# Patient Record
Sex: MALE | Race: OTHER | HISPANIC OR LATINO | Employment: FULL TIME | URBAN - METROPOLITAN AREA
[De-identification: names, ages, dates, MRNs, and addresses within clinical notes are randomized per-mention and may not be internally consistent; named-entity substitution may affect disease eponyms.]

---

## 2018-10-29 ENCOUNTER — APPOINTMENT (OUTPATIENT)
Dept: RADIOLOGY | Age: 28
End: 2018-10-29
Attending: PHYSICIAN ASSISTANT
Payer: COMMERCIAL

## 2018-10-29 ENCOUNTER — OFFICE VISIT (OUTPATIENT)
Dept: URGENT CARE | Age: 28
End: 2018-10-29
Payer: COMMERCIAL

## 2018-10-29 VITALS
WEIGHT: 172 LBS | TEMPERATURE: 99.1 F | HEIGHT: 69 IN | RESPIRATION RATE: 20 BRPM | OXYGEN SATURATION: 98 % | BODY MASS INDEX: 25.48 KG/M2 | HEART RATE: 55 BPM | SYSTOLIC BLOOD PRESSURE: 132 MMHG | DIASTOLIC BLOOD PRESSURE: 70 MMHG

## 2018-10-29 DIAGNOSIS — S92.214A NONDISPLACED FRACTURE OF CUBOID BONE OF RIGHT FOOT, INITIAL ENCOUNTER FOR CLOSED FRACTURE: Primary | ICD-10-CM

## 2018-10-29 DIAGNOSIS — T14.90XA INJURY: ICD-10-CM

## 2018-10-29 PROCEDURE — 29515 APPLICATION SHORT LEG SPLINT: CPT | Performed by: FAMILY MEDICINE

## 2018-10-29 PROCEDURE — S9083 URGENT CARE CENTER GLOBAL: HCPCS | Performed by: FAMILY MEDICINE

## 2018-10-29 PROCEDURE — G0382 LEV 3 HOSP TYPE B ED VISIT: HCPCS | Performed by: FAMILY MEDICINE

## 2018-10-29 PROCEDURE — 73630 X-RAY EXAM OF FOOT: CPT

## 2018-10-29 RX ORDER — ALBUTEROL SULFATE 90 UG/1
AEROSOL, METERED RESPIRATORY (INHALATION)
COMMUNITY
Start: 2017-05-22 | End: 2021-08-04

## 2018-10-29 NOTE — LETTER
October 29, 2018     Patient: Napoleon Paget   YOB: 1990   Date of Visit: 10/29/2018       To Whom it May Concern:    Napoleon Paget was seen in my clinic on 10/29/2018  Please excuse from school due to injury            Sincerely,          Juan Chase PA-C        CC: No Recipients

## 2018-10-29 NOTE — LETTER
October 29, 2018     Patient: Hanane Leigh   YOB: 1990   Date of Visit: 10/29/2018       To Whom It May Concern:    Please excuse Hanane Leigh from work 10/29/2018 due to injury         Sincerely,        Juan Chase PA-C    CC: No Recipients

## 2018-10-30 VITALS
DIASTOLIC BLOOD PRESSURE: 77 MMHG | HEART RATE: 80 BPM | SYSTOLIC BLOOD PRESSURE: 129 MMHG | BODY MASS INDEX: 25.4 KG/M2 | HEIGHT: 69 IN

## 2018-10-30 DIAGNOSIS — S92.214A NONDISPLACED FRACTURE OF CUBOID BONE OF RIGHT FOOT, INITIAL ENCOUNTER FOR CLOSED FRACTURE: Primary | ICD-10-CM

## 2018-10-30 PROCEDURE — 28455 TX TARSAL B1 FX W/MNPJ EACH: CPT | Performed by: ORTHOPAEDIC SURGERY

## 2018-10-30 PROCEDURE — 99203 OFFICE O/P NEW LOW 30 MIN: CPT | Performed by: ORTHOPAEDIC SURGERY

## 2018-10-30 NOTE — PROGRESS NOTES
[facility]        NAME: Cassius Weinstein is a 33 y o  male  : 1990    MRN: [id number]  DATE: 2018  TIME: 9:07 PM    Assessment and Plan   Nondisplaced fracture of cuboid bone of right foot, initial encounter for closed fracture [S92 214A]  1  Nondisplaced fracture of cuboid bone of right foot, initial encounter for closed fracture  XR foot 3+ vw right         Patient Instructions       Follow up with PCP in 3-5 days  Proceed to  ER if symptoms worsen  Chief Complaint     Chief Complaint   Patient presents with    Foot Injury     patient states he missed a step going down and hurt right foot  it happened last night  History of Present Illness       Patient is here for evaluation of pain in his right foot after he came down hard when he missed a step  He has been having difficulty walking  He denies any prior injury to this foot or ankle  Review of Systems   Review of Systems      Current Medications       Current Outpatient Prescriptions:     albuterol (VENTOLIN HFA) 90 mcg/act inhaler, , Disp: , Rfl:     Current Allergies     Allergies as of 10/29/2018 - Reviewed 10/29/2018   Allergen Reaction Noted    Aspirin Hives 2013    Ibuprofen Hives 05/10/2016    Penicillins Hives 05/10/2016            The following portions of the patient's history were reviewed and updated as appropriate: allergies, current medications, past family history, past medical history, past social history, past surgical history and problem list      Past Medical History:   Diagnosis Date    Allergic rhinitis     Asthma        History reviewed  No pertinent surgical history  History reviewed  No pertinent family history  Medications have been verified          Objective   /70   Pulse 55   Temp 99 1 °F (37 3 °C) (Temporal)   Resp 20   Ht 5' 9" (1 753 m)   Wt 78 kg (172 lb)   SpO2 98%   BMI 25 40 kg/m²        Physical Exam     Physical Exam   Constitutional: He is oriented to person, place, and time  He appears well-developed and well-nourished  No distress  HENT:   Head: Normocephalic and atraumatic  Musculoskeletal:   Range of motion of the right foot ankle intact but limited due to pain  Maximal tenderness over the right lateral foot over the cuboid bone  There is some mild focal soft tissue swelling  No visible ecchymosis  No open wound  No laxity  Neurological: He is oriented to person, place, and time  Skin: Skin is warm and dry  Psychiatric: He has a normal mood and affect  His behavior is normal    Nursing note and vitals reviewed  Splint application  Date/Time: 10/29/2018 9:08 PM  Performed by: Danish Ramos  Authorized by: Danish Ramos     Consent:     Consent obtained:  Verbal    Consent given by:  Patient    Risks discussed:  Discoloration, numbness, pain and swelling  Pre-procedure details:     Sensation:  Normal  Procedure details:     Laterality:  Right    Location:  Foot    Foot:  R foot    Strapping: yes      Splint type:  Short leg    Supplies:  Cotton padding and Ortho-Glass  Post-procedure details:     Pain:  Improved    Sensation:  Normal    Patient tolerance of procedure:   Tolerated well, no immediate complications

## 2018-10-30 NOTE — PATIENT INSTRUCTIONS
Ice and elevate as directed    Do not bear weight until you are re-evaluated by Orthopedics  Do not rest your axilla (underarm) on the top of the crutches  Support your weight on the hand       Take Tylenol as needed    Follow-up with orthopedics in 3-5 days

## 2018-10-30 NOTE — PROGRESS NOTES
Arlys Cogan, M D  Attending, Orthopaedic Surgery  [facility] [address] [facility] [address] [facility]        ORTHOPAEDIC FOOT AND ANKLE CLINIC VISIT     Assessment:     Encounter Diagnosis   Name Primary?  Nondisplaced fracture of cuboid bone of right foot, initial encounter for closed fracture Yes              Plan:   · The patient verbalized understanding of exam findings and treatment plan  We engaged in the shared decision-making process and treatment options were discussed at length with the patient  Surgical and conservative management discussed today along with risks and benefits  · NWB in [facility]  · Typically I place people with his injury on ASA daily for DVT ppx  He is unable to take this due to an allergy  We encouraged him to get up and walk every hour to prevent a blood clot  · He will have his cast removed in 3 weeks and be placed into a boot  Return in about 3 weeks (around 11/20/2018)  History of Present Illness:   Chief Complaint:   Chief Complaint   Patient presents with   [address] is a 33 y o  male who is being seen for right cuboid fracture  He slipped while walking down the stairs and sustained the injury  He was seen in the ED and splinted  He is unable to comfortably bear weight  Pain is localized at plantar cuboid with minimal radiating and described as sharp and severe  Patient denies numbness, tingling or radicular pain  Denies history of neuropathy  Patient does not smoke, does not have diabetes and does not take blood thinners  Patient denies family history of anesthesia complications and has not had any complications with anesthesia       Pain/symptom timing:  Worse during the day when active  Pain/symptom context:  Worse with activites and work  Pain/symptom modifying factors:  Rest makes better, activities make worse  Pain/symptom associated signs/symptoms: none    Prior treatment   · NSAIDsYes    · Injections No · Bracing/Orthotics Yes   · Physical Therapy No     Orthopedic Surgical History:   See above    Past Medical, Surgical and Social History:  Past Medical History:  has a past medical history of Allergic rhinitis and Asthma  Problem List:  does not have any pertinent problems on file  Past Surgical History:  has no past surgical history on file  Family History: family history is not on file  Social History:  reports that he has never smoked  He has never used smokeless tobacco  He reports that he does not drink alcohol or use drugs  Current Medications: has a current medication list which includes the following prescription(s): albuterol  Allergies: is allergic to aspirin; ibuprofen; and penicillins  Review of Systems:  General- denies fever/chills  HEENT- denies hearing loss or sore throat  Eyes- denies eye pain or visual disturbances, denies red eyes  Respiratory- denies cough or SOB  Cardio- denies chest pain or palpitations  GI- denies abdominal pain  Endocrine- denies urinary frequency  Urinary- denies pain with urination  Musculoskeletal- Negative except noted above  Skin- denies rashes or wounds  Neurological- denies dizziness or headache  Psychiatric- denies anxiety or difficulty concentrating    Physical Exam:   /77 (BP Location: Right arm, Patient Position: Sitting, Cuff Size: Adult)   Pulse 80   Ht 5' 9" (1 753 m)   BMI 25 40 kg/m²   General/Constitutional: No apparent distress: well-nourished and well developed  Eyes: normal ocular motion  Lymphatic: No appreciable lymphadenopathy  Respiratory: Non-labored breathing  Vascular: No edema, swelling or tenderness, except as noted in detailed exam   Integumentary: No impressive skin lesions present, except as noted in detailed exam   Neuro: No ataxia or tremors noted  Psych: Normal mood and affect, oriented to person, place and time  Appropriate affect  Musculoskeletal: Normal, except as noted in detailed exam and in HPI      Examination Right    Gait Unable to bear weight   Musculoskeletal Tender to palpation at cuboid plantarly    Skin Normal       Nails Normal    Range of Motion  Limited motion 2/2 pain    Stability Stable    Muscle Strength 5/5 tibialis anterior  5/5 gastrocnemius-soleus  5/5 posterior tibialis  5/5 peroneal/eversion strength  5/5 EHL  5/5 FHL    Neurologic Normal    Sensation Intact to light touch throughout sural, saphenous, superficial peroneal, deep peroneal and medial/lateral plantar nerve distributions  [place]-Marie 5 07 filament (10g) testing deferred  Cardiovascular Brisk capillary refill < 2 seconds,intact DP and PT pulses    Special Tests None      Imaging Studies:   3 views of the right foot were taken, reviewed and interpreted independently that demonstrate avulsion off plantar cuboid near the area of an os peroneum  Fracture line extends into the body of the cuboid but does not appear intra-articular  The fragment looks acute and not rounded like a typical os  Reviewed by me personally  Fracture / Dislocation Treatment  Date/Time: 10/30/2018 3:42 PM  Performed by: Eli Junior  Authorized by: Cande CRAWLEY     Patient Location:  Clinic  Consent given by:  Patient  Patient states understanding of procedure being performed: Yes    Test results available and properly labeled: Yes    Injury location:  Foot  Location details:  Right foot  Injury type:  Fracture  Fracture type: cuboid    Distal perfusion: normal    Neurological function: normal    Range of motion: reduced    Manipulation performed?: Yes    Reduction successful?: Yes    Immobilization:  Cast  Cast type:  Short leg  Supplies used:  Cotton padding and fiberglass  Neurovascular status: Neurovascularly intact    Distal perfusion: normal    Neurological function: normal    Range of motion: unchanged    Patient tolerance:  Patient tolerated the procedure well with no immediate complications          Grace Sale Lachman, MD  Foot & Ankle Surgery   Department of [address] [place] [address]      I personally performed the service  Mona Smolder Lachman, MD

## 2018-11-05 NOTE — TELEPHONE ENCOUNTER
Caller: patient  Call back number: [phone]  Patient's doctor: Lachman    Patient is asking for a note that he can not be on his foot  His employer does have light duty  Patient will have fax number before the note is done

## 2018-11-20 ENCOUNTER — OFFICE VISIT (OUTPATIENT)
Dept: OBGYN CLINIC | Facility: CLINIC | Age: 28
End: 2018-11-20

## 2018-11-20 VITALS — BODY MASS INDEX: 25.4 KG/M2 | HEIGHT: 69 IN

## 2018-11-20 DIAGNOSIS — S92.214D CLOSED NONDISPLACED FRACTURE OF CUBOID OF RIGHT FOOT WITH ROUTINE HEALING, SUBSEQUENT ENCOUNTER: Primary | ICD-10-CM

## 2018-11-20 DIAGNOSIS — S92.214A NONDISPLACED FRACTURE OF CUBOID BONE OF RIGHT FOOT, INITIAL ENCOUNTER FOR CLOSED FRACTURE: ICD-10-CM

## 2018-11-20 PROCEDURE — 99024 POSTOP FOLLOW-UP VISIT: CPT | Performed by: ORTHOPAEDIC SURGERY

## 2018-11-20 RX ORDER — ACETAMINOPHEN 325 MG/1
650 TABLET ORAL EVERY 6 HOURS PRN
COMMUNITY

## 2018-11-20 NOTE — PROGRESS NOTES
ALEXANDRE Leal  Attending, Orthopaedic Surgery  [facility] [address] [facility] [address] [facility]      ORTHOPAEDIC FOOT AND ANKLE CLINIC VISIT     Assessment:     Encounter Diagnosis   Name Primary?  Closed nondisplaced fracture of cuboid of right foot with routine healing, subsequent encounter Yes          Plan:   · The patient verbalized understanding of exam findings and treatment plan  We engaged in the shared decision-making process and treatment options were discussed at length with the patient  Surgical and conservative management discussed today along with risks and benefits  · Discontinue cast to a low tide cam walker boot  · He is to start physical therapy  · Activity to tolerance  Return in about 4 weeks (around 12/18/2018) for Recheck right foot cuboid fracture  Gaurav Simple History of Present Illness:   Chief Complaint:   Chief Complaint   Patient presents with    Right Ankle - Follow-up     Angela Scruggs is a 33 y o  male who is being seen in follow-up for Right cuboid fracture s/p fall  When we last saw he we recommended NWB short leg cast with crutch assistance  Pain has  improved  Residual pain is localized at the right cuboid and 5th metatarsal base with minimal radiating and described as sharp and severe  Pain/symptom timing:  Worse during the day when active  Pain/symptom context:  Worse with activites and work  Pain/symptom modifying factors:  Rest makes better, activities make worse  Pain/symptom associated signs/symptoms: none    Prior treatment   · NSAIDsYes   · Injections No   · Bracing/Orthotics Yes    · Physical Therapy No     Orthopedic Surgical History:   none    Past Medical, Surgical and Social History:  Past Medical History:  has a past medical history of Allergic rhinitis and Asthma  Problem List:  does not have any pertinent problems on file  Past Surgical History:  has no past surgical history on file  Family History: family history is not on file    Social History:  reports that he has never smoked  He has never used smokeless tobacco  He reports that he does not drink alcohol or use drugs  Current Medications: has a current medication list which includes the following prescription(s): acetaminophen and albuterol  Allergies: is allergic to aspirin; ibuprofen; and penicillins  Review of Systems:  General- denies fever/chills  HEENT- denies hearing loss or sore throat  Eyes- denies eye pain or visual disturbances, denies red eyes  Respiratory- denies cough or SOB  Cardio- denies chest pain or palpitations  GI- denies abdominal pain  Endocrine- denies urinary frequency  Urinary- denies pain with urination  Musculoskeletal- Negative except noted above  Skin- denies rashes or wounds  Neurological- denies dizziness or headache  Psychiatric- denies anxiety or difficulty concentrating    Physical Exam:   Ht 5' 9" (1 753 m)   BMI 25 40 kg/m²   General/Constitutional: No apparent distress: well-nourished and well developed  Eyes: normal ocular motion  Lymphatic: No appreciable lymphadenopathy  Respiratory: Non-labored breathing  Vascular: No edema, swelling or tenderness, except as noted in detailed exam   Integumentary: No impressive skin lesions present, except as noted in detailed exam   Neuro: No ataxia or tremors noted  Psych: Normal mood and affect, oriented to person, place and time  Appropriate affect  Musculoskeletal: Normal, except as noted in detailed exam and in HPI      Examination    Right    Gait NWB in a short leg cast    Musculoskeletal Tender to palpation at right cuboid and base of the 5th metatarsal     Skin No edema or ecchymosis present     Nails Normal    Range of Motion  20 degrees dorsiflexion, 40 degrees plantarflexion  Subtalar motion: normal    Stability Stable    Muscle Strength 5/5 tibialis anterior  5/5 gastrocnemius-soleus  4/5 posterior tibialis  4/5 peroneal/eversion strength  5/5 EHL  5/5 FHL    Neurologic Normal    Sensation Intact to light touch throughout sural, saphenous, superficial peroneal, deep peroneal and medial/lateral plantar nerve distributions  [place]-Marie 5 07 filament (10g) testing deferred  Cardiovascular Brisk capillary refill < 2 seconds,intact DP and PT pulses    Special Tests None      Imaging Studies:   No new imaging    Scribe Attestation    I,:   Raeann Aparicio am acting as a scribe while in the presence of the attending physician :        I,:   He Jean Baptiste MD personally performed the services described in this documentation    as scribed in my presence :                Therese Schneider Lachman, MD  Foot & Ankle Surgery   Department [address]      I personally performed the service  Therese Schneider Lachman, MD

## 2018-11-20 NOTE — LETTER
November 20, 2018     Patient: Angela Scruggs   YOB: 1990   Date of Visit: 11/20/2018       To Whom it May Concern:    Angela Scruggs is under my professional care  He was seen in my office on 11/20/2018  Francescakenneth Minayadominick is to remain on light duty until his next appointment x 4 weeks  He has a foot injury that requires periods of rest in his cam walker boot  If you have any questions or concerns, please don't hesitate to call           Sincerely,          Ramirez Mejias MD        CC: Angela Scruggs

## 2018-12-04 ENCOUNTER — EVALUATION (OUTPATIENT)
Dept: PHYSICAL THERAPY | Facility: CLINIC | Age: 28
End: 2018-12-04
Payer: COMMERCIAL

## 2018-12-04 DIAGNOSIS — S92.214A NONDISPLACED FRACTURE OF CUBOID BONE OF RIGHT FOOT, INITIAL ENCOUNTER FOR CLOSED FRACTURE: ICD-10-CM

## 2018-12-04 DIAGNOSIS — S92.214D CLOSED NONDISPLACED FRACTURE OF CUBOID OF RIGHT FOOT WITH ROUTINE HEALING, SUBSEQUENT ENCOUNTER: Primary | ICD-10-CM

## 2018-12-04 PROCEDURE — G8990 OTHER PT/OT CURRENT STATUS: HCPCS | Performed by: PHYSICAL THERAPIST

## 2018-12-04 PROCEDURE — 97162 PT EVAL MOD COMPLEX 30 MIN: CPT | Performed by: PHYSICAL THERAPIST

## 2018-12-04 PROCEDURE — G8991 OTHER PT/OT GOAL STATUS: HCPCS | Performed by: PHYSICAL THERAPIST

## 2018-12-04 PROCEDURE — 97110 THERAPEUTIC EXERCISES: CPT | Performed by: PHYSICAL THERAPIST

## 2018-12-04 NOTE — PROGRESS NOTES
PT Evaluation     Today's date: 2018  Patient name: Venkata Amaya  : 1990  MRN: [id number]  Referring provider: Clara Linda MD  Dx:   Encounter Diagnosis     ICD-10-CM    1  Closed nondisplaced fracture of cuboid of right foot with routine healing, subsequent encounter W38 605D Ambulatory referral to Physical Therapy                  Assessment  Assessment details: Patient presents with signs and symptoms consistent with referring diagnosis  Positive prognostic indicators include: Motivated to improve Negative prognostic indicators include: 20% co-insurance and OOW- will need to limit frequency of PT sessions  He presents with: pain, decreased: ROM, strength and  functional capacity consistent with s/p R cuboid fracture  [place]  He requires skilled PT to address these deficits and restore maximal functional capacity  Thank you for this pleasant referral        Impairments: abnormal gait, abnormal or restricted ROM, impaired balance, impaired physical strength, lacks appropriate home exercise program, pain with function and weight-bearing intolerance  Understanding of Dx/Px/POC: good   Prognosis: good    Goals  ST-6 weeks  1  Patient to be independent with HEP  2   Decrease pain at least 2 subjective levels  LT-12 weeks  1    Patient to voice comfort with self management of condition  2   75% or > decreased pain  3   75% or > decreased functional deficits  4   Normalize AROM of all deficit planes  5   Normalize strength  6   Functional Status Score: 64  7  Patient to voice understanding of activities/positions to avoid    8   Normalize Gait    Plan  Patient would benefit from: skilled PT  Referral necessary: No  Planned modality interventions: cryotherapy  Planned therapy interventions: IADL retraining, joint mobilization, manual therapy, motor coordination training, neuromuscular re-education, patient education, postural training, self care, strengthening, stretching, therapeutic activities, therapeutic exercise, home exercise program, flexibility, ADL training, balance and body mechanics training  Frequency: 2x week  Duration in weeks: 8  Treatment plan discussed with: patient        Subjective Evaluation    History of Present Illness  Date of onset: 10/28/2018  Mechanism of injury: Chief Complaint: Difficulty walking, pain    History: On 10/28 patient missed a step and suffered a R cuboid fracture  He had difficulty walking  He was seen in the ER, x-rays were performed and he was referred to ortho  He was casted for 3 weeks and was NWB  The cast was removed and he was placed in a CAM boot on   He has been PWB with [employer] in [employer]  He works in maintenance at [employer]  He was been OOW since the injury; but is on his feet all day when working  He lives in a multistory home  He is unable to drive  PMH:      Aggravating factors: Walking > 10'; Standing > 5 minutes  Relieving factors: NWB    Functional Deficits: OOW, Driving    Patient Goals: Walk normal    Quality of life: good    Pain  At best pain ratin  At worst pain ratin  Location: Lateral midfoot          Objective     Active Range of Motion     Right Ankle/Foot   Dorsiflexion (ke): 10 degrees   Dorsiflexion (kf): 10 degrees   Plantar flexion: 50 degrees   Inversion: 20 degrees   Eversion: 5 degrees   Great toe extension: 60 degrees     Passive Range of Motion     Right Ankle/Foot    Dorsiflexion (ke): 15 degrees   Dorsiflexion (kf): 15 degrees    Plantar flexion: 60 degrees   Inversion: 30 degrees   Eversion: 15 degrees     Strength/Myotome Testing     Right Ankle/Foot   Dorsiflexion: 4  Inversion: 3+  Eversion: 3+  Great toe extension: 4    General Comments     Ankle/Foot Comments   Weight Acceptance: 125 lbs (in CAM boot)  TTP R Cuboid (mild)  Girth:  Ankle R 24; L 23 cm            Cuboid mid foot: R 25; L 24cm  Hip/Knee MMT 4-/5      Flowsheet Rows      Most Recent Value   PT/OT G-Codes   Current Score  32   Projected Score  64          Precautions: WBAT (CAM boot), 2 Unit Max (20% co-insurance)    Daily Treatment Diary     Manual  12/4            PROM                                                                     Exercise Diary  12/4            HEP 10'            [employer] HEP updated (1x/week)                          TM PWM             Gastroc/Soleus Str             Ankle TB 4way             Leg press             Squats             Ankle Alphabet             Ankle Isometrics             SLS when able                          When no CAM:             HR/TR             SLS UE/LE             HR on Leg Press                                                                     Modalities              CP prn

## 2018-12-11 ENCOUNTER — APPOINTMENT (OUTPATIENT)
Dept: PHYSICAL THERAPY | Facility: CLINIC | Age: 28
End: 2018-12-11
Payer: COMMERCIAL

## 2018-12-14 ENCOUNTER — OFFICE VISIT (OUTPATIENT)
Dept: PHYSICAL THERAPY | Facility: CLINIC | Age: 28
End: 2018-12-14
Payer: COMMERCIAL

## 2018-12-14 DIAGNOSIS — S92.214A NONDISPLACED FRACTURE OF CUBOID BONE OF RIGHT FOOT, INITIAL ENCOUNTER FOR CLOSED FRACTURE: ICD-10-CM

## 2018-12-14 DIAGNOSIS — S92.214D CLOSED NONDISPLACED FRACTURE OF CUBOID OF RIGHT FOOT WITH ROUTINE HEALING, SUBSEQUENT ENCOUNTER: Primary | ICD-10-CM

## 2018-12-14 PROCEDURE — 97110 THERAPEUTIC EXERCISES: CPT

## 2018-12-14 PROCEDURE — 97140 MANUAL THERAPY 1/> REGIONS: CPT

## 2018-12-14 NOTE — PROGRESS NOTES
Daily Note     Today's date: 2018  Patient name: Victoriano Simmons  : 1990  MRN: [id number]  Referring provider: Erica Pedroza MD  Dx:   Encounter Diagnosis     ICD-10-CM    1  Closed nondisplaced fracture of cuboid of right foot with routine healing, subsequent encounter S92 214D    2  Nondisplaced fracture of cuboid bone of right foot, initial encounter for closed fracture S92 214A        Start Time: 1400  Stop Time: 1450  Total time in clinic (min): 50 minutes    Subjective: Pt reports no pain before session, Pt has MD appt on Dec 18th  Objective: See treatment diary below  Precautions: WBAT (CAM boot), 2 Unit Max (20% co-insurance)    Daily Treatment Diary     Manual             PROM 10' 10                                                                   Exercise Diary             HEP 10'            Kepe HEP updated (1x/week)                          TM PWM             Gastroc/Soleus Str  30"x3           Ankle TB 4way  20x  GTB           Leg press  40#  30x           Squats  1x Pain            Ankle Alphabet  2x           Ankle Isometrics  5"x10           SLS when able                          When no CAM:             HR/TR             SLS UE/LE             HR on Leg Press                                                                     Modalities              CP prn  10                                           Assessment: Tolerated treatment well  Patient demonstrated fatigue post treatment, exhibited good technique with therapeutic exercises and would benefit from continued PT      Plan: Continue per plan of care

## 2018-12-18 ENCOUNTER — OFFICE VISIT (OUTPATIENT)
Dept: PHYSICAL THERAPY | Facility: CLINIC | Age: 28
End: 2018-12-18
Payer: COMMERCIAL

## 2018-12-18 VITALS
HEIGHT: 69 IN | HEART RATE: 58 BPM | DIASTOLIC BLOOD PRESSURE: 78 MMHG | SYSTOLIC BLOOD PRESSURE: 121 MMHG | BODY MASS INDEX: 25.4 KG/M2

## 2018-12-18 DIAGNOSIS — S92.214A NONDISPLACED FRACTURE OF CUBOID BONE OF RIGHT FOOT, INITIAL ENCOUNTER FOR CLOSED FRACTURE: ICD-10-CM

## 2018-12-18 DIAGNOSIS — S92.214A NONDISPLACED FRACTURE OF CUBOID BONE OF RIGHT FOOT, INITIAL ENCOUNTER FOR CLOSED FRACTURE: Primary | ICD-10-CM

## 2018-12-18 DIAGNOSIS — S92.214D CLOSED NONDISPLACED FRACTURE OF CUBOID OF RIGHT FOOT WITH ROUTINE HEALING, SUBSEQUENT ENCOUNTER: Primary | ICD-10-CM

## 2018-12-18 PROCEDURE — 99024 POSTOP FOLLOW-UP VISIT: CPT | Performed by: ORTHOPAEDIC SURGERY

## 2018-12-18 PROCEDURE — 97140 MANUAL THERAPY 1/> REGIONS: CPT | Performed by: PHYSICAL THERAPIST

## 2018-12-18 PROCEDURE — 97112 NEUROMUSCULAR REEDUCATION: CPT | Performed by: PHYSICAL THERAPIST

## 2018-12-18 NOTE — PROGRESS NOTES
ALEXANDRE Robbins  Attending, Orthopaedic Surgery  Foot and [address] [facility] [address] [facility]      ORTHOPAEDIC FOOT AND ANKLE CLINIC VISIT     Assessment:     Encounter Diagnosis   Name Primary?  Nondisplaced fracture of cuboid bone of right foot, initial encounter for closed fracture Yes            Plan:   · The patient verbalized understanding of exam findings and treatment plan  We engaged in the shared decision-making process and treatment options were discussed at length with the patient  Surgical and conservative management discussed today along with risks and benefits  · He is to transition out of cam walker boot to stiff soled shoe  · May return to work without restrictions  · Activity to tolerance  Return if symptoms worsen or fail to improve  History of Present Illness:   Chief Complaint:   Chief Complaint   Patient presents with   [address] is a 33 y o  male who is being seen in follow-up for Right cuboid fracture  When we last saw he we recommended physical therapy, cam walker boot weight bearing  Pain has significantly  improved  Residual pain is localized at 5th metatarsal with minimal radiating and described as sharp and severe  Pain/symptom timing:  Worse during the day when active  Pain/symptom context:  Worse with activites and work  Pain/symptom modifying factors:  Rest makes better, activities make worse  Pain/symptom associated signs/symptoms: none    Prior treatment   · NSAIDsYes   · Injections No   · Bracing/Orthotics Yes    · Physical Therapy Yes     Orthopedic Surgical History:   none    Past Medical, Surgical and Social History:  Past Medical History:  has a past medical history of Allergic rhinitis and Asthma  Problem List:  does not have any pertinent problems on file  Past Surgical History:  has no past surgical history on file  Family History: family history is not on file    Social History:  reports that he has never smoked  He has never used smokeless tobacco  He reports that he does not drink alcohol or use drugs  Current Medications: has a current medication list which includes the following prescription(s): acetaminophen and albuterol  Allergies: is allergic to aspirin; ibuprofen; and penicillins  Review of Systems:  General- denies fever/chills  HEENT- denies hearing loss or sore throat  Eyes- denies eye pain or visual disturbances, denies red eyes  Respiratory- denies cough or SOB  Cardio- denies chest pain or palpitations  GI- denies abdominal pain  Endocrine- denies urinary frequency  Urinary- denies pain with urination  Musculoskeletal- Negative except noted above  Skin- denies rashes or wounds  Neurological- denies dizziness or headache  Psychiatric- denies anxiety or difficulty concentrating    Physical Exam:   /78 (BP Location: Right arm, Patient Position: Sitting, Cuff Size: Adult)   Pulse 58   Ht 5' 9" (1 753 m)   BMI 25 40 kg/m²   General/Constitutional: No apparent distress: well-nourished and well developed  Eyes: normal ocular motion  Lymphatic: No appreciable lymphadenopathy  Respiratory: Non-labored breathing  Vascular: No edema, swelling or tenderness, except as noted in detailed exam   Integumentary: No impressive skin lesions present, except as noted in detailed exam   Neuro: No ataxia or tremors noted  Psych: Normal mood and affect, oriented to person, place and time  Appropriate affect  Musculoskeletal: Normal, except as noted in detailed exam and in HPI  Examination    Right    Gait He is in a cam walker boot     Musculoskeletal Tender to palpation at 5th metatarsal shaft minimal, no pal     Skin Normal       Nails Normal    Range of Motion  full degrees dorsiflexion, full degrees plantarflexion  Subtalar motion: normal    Stability Stable    Muscle Strength 5/5 tibialis anterior  5/5 gastrocnemius-soleus  5/5 posterior tibialis  5/5 peroneal/eversion strength  5/5 EHL  5/5 FHL    Neurologic Normal    Sensation Intact to light touch throughout sural, saphenous, superficial peroneal, deep peroneal and medial/lateral plantar nerve distributions  [place] 5 07 filament (10g) testing deferred  Cardiovascular Brisk capillary refill < 2 seconds,intact DP and PT pulses    Special Tests None      Imaging Studies:   No new imaging          James R Lachman, MD  Foot & Ankle Surgery   Department of [address] [place] [address]      I personally performed the service  Fern Como Lachman, MD

## 2018-12-18 NOTE — LETTER
December 18, 2018     Patient: Sean Samuel   YOB: 1990   Date of Visit: 12/18/2018       To Whom it May Concern:    Sean Samuel is under my professional care  He was seen in my office on 12/18/2018  He may return to work on 12/20/18 with no restrictions       If you have any questions or concerns, please don't hesitate to call           Sincerely,          Efrain Caraballo MD        CC: Saen Jimmie

## 2018-12-18 NOTE — PROGRESS NOTES
Daily Note     Today's date: 2018  Patient name: Brina Ramirez  : 1990  MRN: [id number]  Referring provider: Cee Emerson MD  Dx:   Encounter Diagnosis     ICD-10-CM    1  Closed nondisplaced fracture of cuboid of right foot with routine healing, subsequent encounter S92 214D    2  Nondisplaced fracture of cuboid bone of right foot, initial encounter for closed fracture S92 214A                   Subjective: Saw  today- allowed to progress to [facility] out of CAM boot 3 hrs/day, Return to full work on       Objective: See treatment diary below      Assessment: Tolerated treatment well  Patient would benefit from continued PT      Plan: Continue per plan of care       Precautions: WBAT (CAM boot), 2 Unit Max (20% co-insurance)    Daily Treatment Diary     Manual            PROM 10' 10 10'                                                                  Exercise Diary            HEP 10'            Kepe HEP updated (1x/week)                          TM PWM             Gastroc/Soleus Str  30"x3 :20/6          Ankle TB 4way  20x  GTB GTB 20x          Leg press  40#  30x 90# SL 20          Squats  1x Pain  20x          Ankle Alphabet  2x           Ankle Isometrics  5"x10           SLS when able   EO/EC :10/10 ea                                    HR/TR   20x ea          SLS UE/LE cones   5x ea          HR on Leg Press   90# SL 30          Wt Shift   FWB          Wobble bd AP/ ML   SL 20          SLS TBand   NV          Cc walkouts   NV              Modalities              CP prn  10                                     Direct Supervision: [phone]

## 2018-12-26 ENCOUNTER — APPOINTMENT (OUTPATIENT)
Dept: PHYSICAL THERAPY | Facility: CLINIC | Age: 28
End: 2018-12-26
Payer: COMMERCIAL

## 2019-05-08 ENCOUNTER — HOSPITAL ENCOUNTER (EMERGENCY)
Facility: HOSPITAL | Age: 29
Discharge: HOME | End: 2019-05-08
Attending: EMERGENCY MEDICINE | Admitting: EMERGENCY MEDICINE
Payer: COMMERCIAL

## 2019-05-08 VITALS
RESPIRATION RATE: 17 BRPM | OXYGEN SATURATION: 96 % | HEIGHT: 69 IN | TEMPERATURE: 98.1 F | SYSTOLIC BLOOD PRESSURE: 116 MMHG | DIASTOLIC BLOOD PRESSURE: 80 MMHG | WEIGHT: 167 LBS | HEART RATE: 97 BPM | BODY MASS INDEX: 24.73 KG/M2

## 2019-05-08 DIAGNOSIS — R06.02 SOB (SHORTNESS OF BREATH): Primary | ICD-10-CM

## 2019-05-08 DIAGNOSIS — H65.492 CHRONIC MEE (MIDDLE EAR EFFUSION), LEFT: ICD-10-CM

## 2019-05-08 DIAGNOSIS — J45.21 MILD INTERMITTENT ASTHMA WITH EXACERBATION: ICD-10-CM

## 2019-05-08 DIAGNOSIS — J30.2 SEASONAL ALLERGIES: ICD-10-CM

## 2019-05-08 PROCEDURE — 99284 EMERGENCY DEPT VISIT MOD MDM: CPT | Performed by: EMERGENCY MEDICINE

## 2019-05-08 PROCEDURE — 94640 AIRWAY INHALATION TREATMENT: CPT

## 2019-05-08 PROCEDURE — 99284 EMERGENCY DEPT VISIT MOD MDM: CPT

## 2019-05-08 RX ORDER — SODIUM CHLORIDE FOR INHALATION 0.9 %
VIAL, NEBULIZER (ML) INHALATION
Status: COMPLETED
Start: 2019-05-08 | End: 2019-05-08

## 2019-05-08 RX ORDER — ALBUTEROL SULFATE 90 UG/1
2 AEROSOL, METERED RESPIRATORY (INHALATION) ONCE
Status: COMPLETED | OUTPATIENT
Start: 2019-05-08 | End: 2019-05-08

## 2019-05-08 RX ORDER — IPRATROPIUM BROMIDE AND ALBUTEROL SULFATE 2.5; .5 MG/3ML; MG/3ML
3 SOLUTION RESPIRATORY (INHALATION)
Status: DISCONTINUED | OUTPATIENT
Start: 2019-05-08 | End: 2019-05-08 | Stop reason: HOSPADM

## 2019-05-08 RX ADMIN — ISODIUM CHLORIDE 3 ML: 0.03 SOLUTION RESPIRATORY (INHALATION) at 15:20

## 2019-05-08 RX ADMIN — ALBUTEROL SULFATE 2 PUFF: 90 AEROSOL, METERED RESPIRATORY (INHALATION) at 15:58

## 2019-05-08 RX ADMIN — IPRATROPIUM BROMIDE AND ALBUTEROL SULFATE 3 ML: .5; 3 SOLUTION RESPIRATORY (INHALATION) at 15:20

## 2019-05-08 RX ADMIN — DEXAMETHASONE SODIUM PHOSPHATE 10 MG: 10 INJECTION, SOLUTION INTRAMUSCULAR; INTRAVENOUS at 15:19

## 2019-05-24 ENCOUNTER — HOSPITAL ENCOUNTER (EMERGENCY)
Facility: HOSPITAL | Age: 29
Discharge: HOME | End: 2019-05-24
Attending: EMERGENCY MEDICINE | Admitting: EMERGENCY MEDICINE
Payer: COMMERCIAL

## 2019-05-24 VITALS
SYSTOLIC BLOOD PRESSURE: 132 MMHG | RESPIRATION RATE: 24 BRPM | TEMPERATURE: 97.9 F | HEART RATE: 86 BPM | DIASTOLIC BLOOD PRESSURE: 76 MMHG | OXYGEN SATURATION: 93 %

## 2019-05-24 DIAGNOSIS — R06.2 WHEEZING: ICD-10-CM

## 2019-05-24 DIAGNOSIS — R06.00 DYSPNEA: ICD-10-CM

## 2019-05-24 DIAGNOSIS — J45.901 ASTHMA EXACERBATION: Primary | ICD-10-CM

## 2019-05-24 PROCEDURE — 94640 AIRWAY INHALATION TREATMENT: CPT

## 2019-05-24 PROCEDURE — 99284 EMERGENCY DEPT VISIT MOD MDM: CPT | Performed by: EMERGENCY MEDICINE

## 2019-05-24 PROCEDURE — 99284 EMERGENCY DEPT VISIT MOD MDM: CPT

## 2019-05-24 RX ORDER — IPRATROPIUM BROMIDE AND ALBUTEROL SULFATE 2.5; .5 MG/3ML; MG/3ML
3 SOLUTION RESPIRATORY (INHALATION)
Status: DISCONTINUED | OUTPATIENT
Start: 2019-05-24 | End: 2019-05-24 | Stop reason: HOSPADM

## 2019-05-24 RX ORDER — PREDNISONE 20 MG/1
40 TABLET ORAL ONCE
Status: COMPLETED | OUTPATIENT
Start: 2019-05-24 | End: 2019-05-24

## 2019-05-24 RX ORDER — ALBUTEROL SULFATE 90 UG/1
2 AEROSOL, METERED RESPIRATORY (INHALATION) ONCE
Status: COMPLETED | OUTPATIENT
Start: 2019-05-24 | End: 2019-05-24

## 2019-05-24 RX ORDER — PREDNISONE 20 MG/1
40 TABLET ORAL DAILY
Qty: 8 TABLET | Refills: 0 | Status: SHIPPED | OUTPATIENT
Start: 2019-05-24 | End: 2019-05-28

## 2019-05-24 RX ADMIN — IPRATROPIUM BROMIDE AND ALBUTEROL SULFATE 3 ML: 2.5; .5 SOLUTION RESPIRATORY (INHALATION) at 03:02

## 2019-05-24 RX ADMIN — ALBUTEROL SULFATE 2 PUFF: 90 AEROSOL, METERED RESPIRATORY (INHALATION) at 03:25

## 2019-05-24 RX ADMIN — PREDNISONE 40 MG: 20 TABLET ORAL at 03:02

## 2019-05-24 RX ADMIN — IPRATROPIUM BROMIDE AND ALBUTEROL SULFATE 3 ML: 2.5; .5 SOLUTION RESPIRATORY (INHALATION) at 01:57

## 2020-01-15 ENCOUNTER — APPOINTMENT (EMERGENCY)
Dept: RADIOLOGY | Facility: HOSPITAL | Age: 30
End: 2020-01-15
Payer: COMMERCIAL

## 2020-01-15 ENCOUNTER — HOSPITAL ENCOUNTER (EMERGENCY)
Facility: HOSPITAL | Age: 30
Discharge: HOME | End: 2020-01-15
Attending: EMERGENCY MEDICINE | Admitting: EMERGENCY MEDICINE
Payer: COMMERCIAL

## 2020-01-15 VITALS
RESPIRATION RATE: 16 BRPM | HEART RATE: 72 BPM | DIASTOLIC BLOOD PRESSURE: 76 MMHG | BODY MASS INDEX: 25.1 KG/M2 | WEIGHT: 170 LBS | OXYGEN SATURATION: 99 % | TEMPERATURE: 97.8 F | SYSTOLIC BLOOD PRESSURE: 139 MMHG

## 2020-01-15 DIAGNOSIS — M79.671 FOOT PAIN, RIGHT: Primary | ICD-10-CM

## 2020-01-15 PROCEDURE — 73630 X-RAY EXAM OF FOOT: CPT

## 2020-01-15 PROCEDURE — 99284 EMERGENCY DEPT VISIT MOD MDM: CPT | Performed by: PHYSICIAN ASSISTANT

## 2020-01-15 PROCEDURE — 99283 EMERGENCY DEPT VISIT LOW MDM: CPT

## 2020-01-15 RX ORDER — ACETAMINOPHEN 325 MG/1
650 TABLET ORAL ONCE
Status: COMPLETED | OUTPATIENT
Start: 2020-01-15 | End: 2020-01-15

## 2020-01-15 RX ADMIN — ACETAMINOPHEN 650 MG: 325 TABLET ORAL at 09:05

## 2020-01-15 NOTE — ED PROVIDER NOTES
History  Chief Complaint   Patient presents with    Foot Pain     right     32yo male who presents to ER for evaluation of right foot pain  Onset last night  Denies injury  States just over a year ago he had a fracture in his foot which healed and has not had pain since  Denies swelling  Denies deformity  States he followed up with orthopedics and physical therapy and was cleared  Denies ankle pain  No self-treatment  Denies his weakness or paresthesia of the foot  Denies change in footwear  Admits to wearing old boots at work  History provided by:  Patient      Prior to Admission Medications   Prescriptions Last Dose Informant Patient Reported? Taking?   acetaminophen (TYLENOL) 325 mg tablet   Yes No   Sig: Take 650 mg by mouth every 6 (six) hours as needed for mild pain   albuterol (VENTOLIN HFA) 90 mcg/act inhaler   Yes No      Facility-Administered Medications: None       Past Medical History:   Diagnosis Date    Allergic rhinitis     Asthma        History reviewed  No pertinent surgical history  History reviewed  No pertinent family history  I have reviewed and agree with the history as documented  Social History     Tobacco Use    Smoking status: Never Smoker    Smokeless tobacco: Never Used   Substance Use Topics    Alcohol use: No    Drug use: No        Review of Systems   Constitutional: Negative for chills and fever  Musculoskeletal: Negative for joint swelling  Right foot pain   Skin: Negative for rash and wound  Neurological: Negative for weakness and numbness  Physical Exam  Physical Exam   Constitutional: He appears well-developed and well-nourished  Cardiovascular: Intact distal pulses  Musculoskeletal:        Right ankle: Normal         Right foot: There is tenderness  There is normal range of motion, no bony tenderness, no swelling, normal capillary refill, no crepitus and no deformity  Feet:    Skin: Skin is warm and dry     Nursing note and vitals reviewed  Vital Signs  ED Triage Vitals [01/15/20 0857]   Temperature Pulse Respirations Blood Pressure SpO2   97 8 °F (36 6 °C) 72 16 139/76 99 %      Temp Source Heart Rate Source Patient Position - Orthostatic VS BP Location FiO2 (%)   Tympanic -- -- -- --      Pain Score       5           Vitals:    01/15/20 0857   BP: 139/76   Pulse: 72         Visual Acuity      ED Medications  Medications   acetaminophen (TYLENOL) tablet 650 mg (650 mg Oral Given 1/15/20 5987)       Diagnostic Studies  Results Reviewed     None                 XR foot 3+ views RIGHT   ED Interpretation by Chelsey Maldonado PA-C (01/15 0954)   Osteophyte or old avulsion off of cuboid increased in size since 2018                 Procedures  Procedures         ED Course                               MDM  Number of Diagnoses or Management Options  Foot pain, right:      Amount and/or Complexity of Data Reviewed  Tests in the radiology section of CPT®: ordered and reviewed    Risk of Complications, Morbidity, and/or Mortality  General comments: Reviewed abnormality and compared to old x-rays with patient discussed importance of follow-up with doctor understands and agrees to do so  Concerned for bony fragment that did not heal properly and is now increased in size causing arthritis? Patient Progress  Patient progress: stable        Disposition  Final diagnoses: Foot pain, right     Time reflects when diagnosis was documented in both MDM as applicable and the Disposition within this note     Time User Action Codes Description Comment    1/15/2020  9:51 AM Phyllis Farley Add [I68 456] Foot pain, right       ED Disposition     ED Disposition Condition Date/Time Comment    Discharge Stable Wed Wiliam 15, 2020  9:51 AM Elyse Cantrell discharge to home/self care              Follow-up Information     Follow up With Specialties Details Why Contact Info Additional 1975 Jono Lara MD Orthopedic Surgery In 3 days  [address] [facility] [address]  [address] [facility] Emergency Department Emergency Medicine  If symptoms worsen [address]  [phone]  ED, [address] [place] [address], [address]   [phone]          Patient's Medications   Discharge Prescriptions    No medications on file     No discharge procedures on file      ED Provider  Electronically Signed by           Ventura Flores PA-C  01/15/20 0075

## 2020-01-21 ENCOUNTER — OFFICE VISIT (OUTPATIENT)
Dept: OBGYN CLINIC | Facility: CLINIC | Age: 30
End: 2020-01-21
Payer: COMMERCIAL

## 2020-01-21 VITALS
HEART RATE: 66 BPM | BODY MASS INDEX: 30.51 KG/M2 | SYSTOLIC BLOOD PRESSURE: 117 MMHG | DIASTOLIC BLOOD PRESSURE: 74 MMHG | WEIGHT: 206 LBS | HEIGHT: 69 IN

## 2020-01-21 DIAGNOSIS — M77.51 BURSITIS OF INTERMETATARSAL BURSA OF RIGHT FOOT: Primary | ICD-10-CM

## 2020-01-21 PROCEDURE — 99213 OFFICE O/P EST LOW 20 MIN: CPT | Performed by: ORTHOPAEDIC SURGERY

## 2020-01-21 NOTE — PROGRESS NOTES
ALEXNADRE Vernon  Attending, Orthopaedic Surgery  Foot and [address] [facility] [address] [facility]        ORTHOPAEDIC FOOT AND ANKLE CLINIC VISIT     Assessment:     Encounter Diagnosis   Name Primary?  Bursitis of intermetatarsal bursa of right foot Yes              Plan:   · The patient verbalized understanding of exam findings and treatment plan  We engaged in the shared decision-making process and treatment options were discussed at length with the patient  Surgical and conservative management discussed today along with risks and benefits  · He has pain over the base of his 5th Metatarsal with specific shoe wear  · Specifically, wearing work boots causes this issue  He does not have this with wearing accomodative shoes  · He has an Os Peroneum on Xray which he was told was a fracture but this is a normal osseous variant present in a good percentage of people  · We recommended shoewear modification to a wider more accomodative shoe at the midfoot  This should alleviate any irritation he has over the insertion of his peroneus brevis  Return if symptoms worsen or fail to improve  History of Present Illness:   Chief Complaint:   Chief Complaint   Patient presents with   [address] [place] [address] Drive is a 30 y o  male who is being seen for right foot pain  He denies any injury mechanism and states pain present lateral foot intermitted after being on his feet for long periods of time  Pain is localized at lateral foot, base of 5th metatarsal bone with minimal radiating and described as sharp and severe  Patient denies numbness, tingling or radicular pain  Denies history of neuropathy  Patient does not smoke, does not have diabetes and does not take blood thinners  Patient denies family history of anesthesia complications and has not had any complications with anesthesia       Pain/symptom timing:  Worse during the day when active  Pain/symptom context:  Worse with activites and work  Pain/symptom modifying factors:  Rest makes better, activities make worse  Pain/symptom associated signs/symptoms: none    Prior treatment   · NSAIDsNo    · Injections No   · Bracing/Orthotics No   · Physical Therapy No     Orthopedic Surgical History:   See below    Past Medical, Surgical and Social History:  Past Medical History:  has a past medical history of Allergic rhinitis and Asthma  Problem List: does not have any pertinent problems on file  Past Surgical History:  has no past surgical history on file  Family History: family history is not on file  Social History:  reports that he has never smoked  He has never used smokeless tobacco  He reports that he does not drink alcohol or use drugs  Current Medications: has a current medication list which includes the following prescription(s): acetaminophen and albuterol  Allergies: is allergic to aspirin; ibuprofen; and penicillins  Review of Systems:  General- denies fever/chills  HEENT- denies hearing loss or sore throat  Eyes- denies eye pain or visual disturbances, denies red eyes  Respiratory- denies cough or SOB  Cardio- denies chest pain or palpitations  GI- denies abdominal pain  Endocrine- denies urinary frequency  Urinary- denies pain with urination  Musculoskeletal- Negative except noted above  Skin- denies rashes or wounds  Neurological- denies dizziness or headache  Psychiatric- denies anxiety or difficulty concentrating    Physical Exam:   /74 (BP Location: Right arm, Patient Position: Sitting, Cuff Size: Adult)   Pulse 66   Ht 5' 9" (1 753 m)   Wt 93 4 kg (206 lb)   BMI 30 42 kg/m²   General/Constitutional: No apparent distress: well-nourished and well developed    Eyes: normal ocular motion  Lymphatic: No appreciable lymphadenopathy  Respiratory: Non-labored breathing  Vascular: No edema, swelling or tenderness, except as noted in detailed exam   Integumentary: No impressive skin lesions present, except as noted in detailed exam   Neuro: No ataxia or tremors noted  Psych: Normal mood and affect, oriented to person, place and time  Appropriate affect  Musculoskeletal: Normal, except as noted in detailed exam and in HPI  Examination    Right    Gait Normal   Musculoskeletal  No ttp of the lateral foot  Skin Normal       Nails Normal    Range of Motion  full degrees dorsiflexion, full degrees plantarflexion  Subtalar motion: wnl, valgus hindfoot fully correctable    Stability Stable    Muscle Strength 5/5 tibialis anterior  5/5 gastrocnemius-soleus  5/5 posterior tibialis  5/5 peroneal/eversion strength  5/5 EHL  5/5 FHL    Neurologic Normal    Sensation Intact to light touch throughout sural, saphenous, superficial peroneal, deep peroneal and medial/lateral plantar nerve distributions  [place]-Marie 5 07 filament (10g) testing deferred  Cardiovascular Brisk capillary refill < 2 seconds,intact DP and PT pulses    Special Tests None      Imaging Studies:   3 views of the right foot were taken, reviewed and interpreted independently that demonstrate os peroneum, no fracture or dislocation noted  Reviewed by me personally  Scribe Attestation    I,:    am acting as a scribe while in the presence of the attending physician :        I,:    personally performed the services described in this documentation    as scribed in my presence :            Virgel Lefevre Lachman, MD  Foot & Ankle Surgery   Department of [address] [place] [address]      I personally performed the service  Virgel Lefevre Lachman, MD

## 2021-05-20 ENCOUNTER — HOSPITAL ENCOUNTER (EMERGENCY)
Facility: HOSPITAL | Age: 31
Discharge: HOME | End: 2021-05-20
Attending: EMERGENCY MEDICINE
Payer: COMMERCIAL

## 2021-05-20 VITALS
RESPIRATION RATE: 20 BRPM | WEIGHT: 193.12 LBS | BODY MASS INDEX: 28.52 KG/M2 | SYSTOLIC BLOOD PRESSURE: 148 MMHG | HEART RATE: 79 BPM | TEMPERATURE: 98.2 F | DIASTOLIC BLOOD PRESSURE: 98 MMHG | OXYGEN SATURATION: 98 %

## 2021-05-20 DIAGNOSIS — Z76.0 MEDICATION REFILL: ICD-10-CM

## 2021-05-20 DIAGNOSIS — J30.1 SEASONAL ALLERGIC RHINITIS DUE TO POLLEN: Primary | ICD-10-CM

## 2021-05-20 PROCEDURE — 99284 EMERGENCY DEPT VISIT MOD MDM: CPT | Performed by: PHYSICIAN ASSISTANT

## 2021-05-20 PROCEDURE — 99283 EMERGENCY DEPT VISIT LOW MDM: CPT

## 2021-05-20 RX ORDER — ALBUTEROL SULFATE 90 UG/1
2 AEROSOL, METERED RESPIRATORY (INHALATION) EVERY 4 HOURS PRN
Qty: 6.7 G | Refills: 0 | Status: SHIPPED | OUTPATIENT
Start: 2021-05-20 | End: 2022-05-19 | Stop reason: SDUPTHER

## 2021-05-20 RX ORDER — FLUTICASONE PROPIONATE 50 MCG
1 SPRAY, SUSPENSION (ML) NASAL DAILY
Qty: 16 G | Refills: 0 | Status: SHIPPED | OUTPATIENT
Start: 2021-05-20 | End: 2021-08-04

## 2021-05-20 NOTE — ED PROVIDER NOTES
History  Chief Complaint   Patient presents with    Asthma     pt out of Asthma medication and his allergies are uncontrolled   Allergies     35 y o  male presents for evaluation states he is out of his albuterol inhaler and has been experiencing chest tightness worse at night similar to previous asthma exacerbations  States he does take claritin daily, has never been hospitalized, has never needed to be intubated, and has never been on steroids for symptoms  Pt denies wheezing, fevers, chills, N/V/D, SOB, CP, HA, blurry vision, known sick contacts, known covid exposures  Prior to Admission Medications   Prescriptions Last Dose Informant Patient Reported? Taking?   acetaminophen (TYLENOL) 325 mg tablet   Yes No   Sig: Take 650 mg by mouth every 6 (six) hours as needed for mild pain   albuterol (VENTOLIN HFA) 90 mcg/act inhaler   Yes No      Facility-Administered Medications: None       Past Medical History:   Diagnosis Date    Allergic rhinitis     Asthma        History reviewed  No pertinent surgical history  History reviewed  No pertinent family history  I have reviewed and agree with the history as documented  E-Cigarette/Vaping     E-Cigarette/Vaping Substances     Social History     Tobacco Use    Smoking status: Never Smoker    Smokeless tobacco: Never Used   Substance Use Topics    Alcohol use: No    Drug use: No       Review of Systems   Respiratory: Positive for chest tightness  All other systems reviewed and are negative  Physical Exam  Physical Exam  Vitals signs and nursing note reviewed  Constitutional:       Appearance: Normal appearance  He is normal weight  HENT:      Head: Normocephalic and atraumatic  Right Ear: External ear normal       Left Ear: External ear normal       Nose: Nose normal       Mouth/Throat:      Mouth: Mucous membranes are moist       Pharynx: Oropharynx is clear     Eyes:      Conjunctiva/sclera: Conjunctivae normal       Pupils: Pupils are equal, round, and reactive to light  Cardiovascular:      Rate and Rhythm: Normal rate and regular rhythm  Pulses: Normal pulses  Pulmonary:      Effort: Pulmonary effort is normal  No respiratory distress  Breath sounds: Normal breath sounds  No stridor  No wheezing, rhonchi or rales  Chest:      Chest wall: No tenderness  Musculoskeletal: Normal range of motion  Skin:     General: Skin is warm  Capillary Refill: Capillary refill takes less than 2 seconds  Neurological:      General: No focal deficit present  Mental Status: He is alert and oriented to person, place, and time  Mental status is at baseline  Psychiatric:         Mood and Affect: Mood normal          Behavior: Behavior normal          Thought Content:  Thought content normal          Judgment: Judgment normal          Vital Signs  ED Triage Vitals [05/20/21 1504]   Temperature Pulse Respirations Blood Pressure SpO2   98 2 °F (36 8 °C) 79 20 148/98 98 %      Temp Source Heart Rate Source Patient Position - Orthostatic VS BP Location FiO2 (%)   Oral -- -- -- --      Pain Score       --           Vitals:    05/20/21 1504   BP: 148/98   Pulse: 79         Visual Acuity      ED Medications  Medications - No data to display    Diagnostic Studies  Results Reviewed     None                 No orders to display              Procedures  Procedures         ED Course                                           MDM  Number of Diagnoses or Management Options  Medication refill:   Seasonal allergic rhinitis due to pollen:       Disposition  Final diagnoses:   Seasonal allergic rhinitis due to pollen   Medication refill     Time reflects when diagnosis was documented in both MDM as applicable and the Disposition within this note     Time User Action Codes Description Comment    5/20/2021  3:44 PM Jason Later Add [J30 1] Seasonal allergic rhinitis due to pollen     5/20/2021  3:44 PM Jason Later Add [Z76 0] Medication refill       ED Disposition     ED Disposition Condition Date/Time Comment    Discharge Stable Thu May 20, 2021  3:44 PM Kenyetta Lowe discharge to home/self care  Follow-up Information     Follow up With Specialties Details Why Contact Info    [employer]    [phone]            Discharge Medication List as of 5/20/2021  3:49 PM      START taking these medications    Details   !! albuterol (PROVENTIL HFA,VENTOLIN HFA) 90 mcg/act inhaler Inhale 2 puffs every 4 (four) hours as needed for wheezing, Starting Thu 5/20/2021, Normal      fluticasone (FLONASE) 50 mcg/act nasal spray 1 spray into each nostril daily, Starting Thu 5/20/2021, Normal       !! - Potential duplicate medications found  Please discuss with provider  CONTINUE these medications which have NOT CHANGED    Details   acetaminophen (TYLENOL) 325 mg tablet Take 650 mg by mouth every 6 (six) hours as needed for mild pain, Historical Med      !! albuterol (VENTOLIN HFA) 90 mcg/act inhaler Starting Mon 5/22/2017, Historical Med       !! - Potential duplicate medications found  Please discuss with provider  No discharge procedures on file      PDMP Review     None          ED Provider  Electronically Signed by           Homero Crump PA-C  05/20/21 7213

## 2021-07-22 ENCOUNTER — HOSPITAL ENCOUNTER (EMERGENCY)
Facility: HOSPITAL | Age: 31
Discharge: HOME | End: 2021-07-22
Attending: EMERGENCY MEDICINE | Admitting: EMERGENCY MEDICINE
Payer: COMMERCIAL

## 2021-07-22 ENCOUNTER — APPOINTMENT (EMERGENCY)
Dept: RADIOLOGY | Facility: HOSPITAL | Age: 31
End: 2021-07-22
Payer: COMMERCIAL

## 2021-07-22 VITALS
RESPIRATION RATE: 18 BRPM | HEART RATE: 56 BPM | SYSTOLIC BLOOD PRESSURE: 122 MMHG | TEMPERATURE: 99 F | OXYGEN SATURATION: 99 % | DIASTOLIC BLOOD PRESSURE: 78 MMHG

## 2021-07-22 DIAGNOSIS — R07.89 ATYPICAL CHEST PAIN: Primary | ICD-10-CM

## 2021-07-22 LAB
ANION GAP SERPL CALCULATED.3IONS-SCNC: 6 MMOL/L (ref 4–13)
ATRIAL RATE: 45 BPM
BASOPHILS # BLD AUTO: 0.03 THOUSANDS/ΜL (ref 0–0.1)
BASOPHILS NFR BLD AUTO: 0 % (ref 0–1)
BUN SERPL-MCNC: 11 MG/DL (ref 5–25)
CALCIUM SERPL-MCNC: 9 MG/DL (ref 8.3–10.1)
CHLORIDE SERPL-SCNC: 108 MMOL/L (ref 100–108)
CO2 SERPL-SCNC: 25 MMOL/L (ref 21–32)
CREAT SERPL-MCNC: 0.9 MG/DL (ref 0.6–1.3)
EOSINOPHIL # BLD AUTO: 0.32 THOUSAND/ΜL (ref 0–0.61)
EOSINOPHIL NFR BLD AUTO: 4 % (ref 0–6)
ERYTHROCYTE [DISTWIDTH] IN BLOOD BY AUTOMATED COUNT: 12.4 % (ref 11.6–15.1)
GFR SERPL CREATININE-BSD FRML MDRD: 115 ML/MIN/1.73SQ M
GLUCOSE SERPL-MCNC: 76 MG/DL (ref 65–140)
HCT VFR BLD AUTO: 38.4 % (ref 36.5–49.3)
HGB BLD-MCNC: 13.1 G/DL (ref 12–17)
IMM GRANULOCYTES # BLD AUTO: 0.01 THOUSAND/UL (ref 0–0.2)
IMM GRANULOCYTES NFR BLD AUTO: 0 % (ref 0–2)
LYMPHOCYTES # BLD AUTO: 1.74 THOUSANDS/ΜL (ref 0.6–4.47)
LYMPHOCYTES NFR BLD AUTO: 24 % (ref 14–44)
MCH RBC QN AUTO: 29.6 PG (ref 26.8–34.3)
MCHC RBC AUTO-ENTMCNC: 34.1 G/DL (ref 31.4–37.4)
MCV RBC AUTO: 87 FL (ref 82–98)
MONOCYTES # BLD AUTO: 0.6 THOUSAND/ΜL (ref 0.17–1.22)
MONOCYTES NFR BLD AUTO: 8 % (ref 4–12)
NEUTROPHILS # BLD AUTO: 4.63 THOUSANDS/ΜL (ref 1.85–7.62)
NEUTS SEG NFR BLD AUTO: 64 % (ref 43–75)
NRBC BLD AUTO-RTO: 0 /100 WBCS
P AXIS: 66 DEGREES
PLATELET # BLD AUTO: 169 THOUSANDS/UL (ref 149–390)
PMV BLD AUTO: 12.5 FL (ref 8.9–12.7)
POTASSIUM SERPL-SCNC: 3.4 MMOL/L (ref 3.5–5.3)
PR INTERVAL: 128 MS
QRS AXIS: 81 DEGREES
QRSD INTERVAL: 96 MS
QT INTERVAL: 480 MS
QTC INTERVAL: 415 MS
RBC # BLD AUTO: 4.42 MILLION/UL (ref 3.88–5.62)
SODIUM SERPL-SCNC: 139 MMOL/L (ref 136–145)
T WAVE AXIS: 58 DEGREES
TROPONIN I SERPL-MCNC: <0.02 NG/ML
VENTRICULAR RATE: 45 BPM
WBC # BLD AUTO: 7.33 THOUSAND/UL (ref 4.31–10.16)

## 2021-07-22 PROCEDURE — 80048 BASIC METABOLIC PNL TOTAL CA: CPT | Performed by: EMERGENCY MEDICINE

## 2021-07-22 PROCEDURE — 93010 ELECTROCARDIOGRAM REPORT: CPT | Performed by: INTERNAL MEDICINE

## 2021-07-22 PROCEDURE — 84484 ASSAY OF TROPONIN QUANT: CPT | Performed by: EMERGENCY MEDICINE

## 2021-07-22 PROCEDURE — 99285 EMERGENCY DEPT VISIT HI MDM: CPT | Performed by: EMERGENCY MEDICINE

## 2021-07-22 PROCEDURE — 71046 X-RAY EXAM CHEST 2 VIEWS: CPT

## 2021-07-22 PROCEDURE — 85025 COMPLETE CBC W/AUTO DIFF WBC: CPT | Performed by: EMERGENCY MEDICINE

## 2021-07-22 PROCEDURE — 93005 ELECTROCARDIOGRAM TRACING: CPT

## 2021-07-22 PROCEDURE — 36415 COLL VENOUS BLD VENIPUNCTURE: CPT | Performed by: EMERGENCY MEDICINE

## 2021-07-22 PROCEDURE — 99285 EMERGENCY DEPT VISIT HI MDM: CPT

## 2021-07-22 RX ORDER — ACETAMINOPHEN 325 MG/1
650 TABLET ORAL ONCE
Status: COMPLETED | OUTPATIENT
Start: 2021-07-22 | End: 2021-07-22

## 2021-07-22 RX ORDER — LIDOCAINE 50 MG/G
1 PATCH TOPICAL ONCE
Status: DISCONTINUED | OUTPATIENT
Start: 2021-07-22 | End: 2021-07-22 | Stop reason: HOSPADM

## 2021-07-22 RX ADMIN — LIDOCAINE 1 PATCH: 50 PATCH TOPICAL at 17:15

## 2021-07-22 RX ADMIN — ACETAMINOPHEN 650 MG: 325 TABLET, FILM COATED ORAL at 17:15

## 2021-07-22 NOTE — ED ATTENDING ATTESTATION
7/22/2021  I, Lilia Engle MD, saw and evaluated the patient  I have discussed the patient with the resident/non-physician practitioner and agree with the resident's/non-physician practitioner's findings, Plan of Care, and MDM as documented in the resident's/non-physician practitioner's note, except where noted  All available labs and Radiology studies were reviewed  I was present for key portions of any procedure(s) performed by the resident/non-physician practitioner and I was immediately available to provide assistance  At this point I agree with the current assessment done in the Emergency Department  I have conducted an independent evaluation of this patient a history and physical is as follows:   The patient presents for evaluation of 3 days of left-sided chest pain left lateral aspect of his chest without radiation described as sharp seems worse if he touches the area or moves he has not been coughing has no shortness of breath he has no fever or chills no diaphoresis cardiac risk factors none specifically no diabetes no hypertension hypercholesterolemia no family history nonsmoker no drug use no stimulant use  Patient denies leg pain or leg swelling no history DVT or pulmonary embolism no risk for such  EXAM:   Const:   well appearing   NAD     HEENT:  NCAT    sclera anicteric conjunctiva pink   throat clear, MMM    Neck:   supple  no meningismus  no jvd   no bruits  no  midline tenderness   Lungs:   clear  CW -tender left  No creiptation no rash  Heart:   RRR no m/g/r  Normal pulses  Abd:   soft nt nd pos bs   Ext:    normal nontender  No edema  Neruo:   CN 2 -12 intact  motor intact 5/5 sensory intact cerebellar intact       Gait normal    IMPRESSION:  Musculoskeletal chest pain  PLAN:  EKG shows sinus bradycardia with no acute ischemic changes    ED Course         Critical Care Time  Procedures

## 2021-07-23 NOTE — ED PROVIDER NOTES
History  Chief Complaint   Patient presents with    Chest Pain     cp for 2 days  pt states today the pain is significantly worse  denies sob      81-year-old male with history of asthma presents to the emergency department for evaluation of chest pain  The patient reports having left-sided chest pain over the past 2 days  States that the pain was worsening today so he came to the emergency department for further evaluation  He describes the chest pain as sharp 6/10 left-sided chest pain radiating to the center of his chest   He states that it is nonexertional with no alleviating or exacerbating factors  He has not taken any medication to treat his symptoms  He denies fevers, chills, nausea, vomiting, diarrhea, diaphoresis, shortness of breath and trauma to the area  Prior to Admission Medications   Prescriptions Last Dose Informant Patient Reported? Taking?   acetaminophen (TYLENOL) 325 mg tablet   Yes No   Sig: Take 650 mg by mouth every 6 (six) hours as needed for mild pain   albuterol (PROVENTIL HFA,VENTOLIN HFA) 90 mcg/act inhaler   No No   Sig: Inhale 2 puffs every 4 (four) hours as needed for wheezing   albuterol (VENTOLIN HFA) 90 mcg/act inhaler   Yes No   fluticasone (FLONASE) 50 mcg/act nasal spray   No No   Si spray into each nostril daily      Facility-Administered Medications: None       Past Medical History:   Diagnosis Date    Allergic rhinitis     Asthma        History reviewed  No pertinent surgical history  History reviewed  No pertinent family history  I have reviewed and agree with the history as documented  E-Cigarette/Vaping     E-Cigarette/Vaping Substances     Social History     Tobacco Use    Smoking status: Never Smoker    Smokeless tobacco: Never Used   Substance Use Topics    Alcohol use: No    Drug use: No        Review of Systems   Constitutional: Negative for chills and fever  HENT: Negative for ear pain and sore throat      Eyes: Negative for pain and visual disturbance  Respiratory: Negative for cough and shortness of breath  Cardiovascular: Positive for chest pain  Negative for palpitations  Gastrointestinal: Negative for abdominal pain and vomiting  Genitourinary: Negative for dysuria and hematuria  Musculoskeletal: Negative for arthralgias and back pain  Skin: Negative for color change and rash  Neurological: Negative for seizures and syncope  All other systems reviewed and are negative  Physical Exam  ED Triage Vitals   Temperature Pulse Respirations Blood Pressure SpO2   07/22/21 1536 07/22/21 1536 07/22/21 1536 07/22/21 1536 07/22/21 1536   99 °F (37 2 °C) 82 16 147/87 98 %      Temp src Heart Rate Source Patient Position - Orthostatic VS BP Location FiO2 (%)   -- 07/22/21 1536 07/22/21 1745 07/22/21 1745 --    Monitor Sitting Right arm       Pain Score       07/22/21 1715       5             Orthostatic Vital Signs  Vitals:    07/22/21 1536 07/22/21 1745   BP: 147/87 122/78   Pulse: 82 56   Patient Position - Orthostatic VS:  Sitting       Physical Exam  Vitals and nursing note reviewed  Constitutional:       Appearance: He is well-developed  HENT:      Head: Normocephalic and atraumatic  Eyes:      Conjunctiva/sclera: Conjunctivae normal    Cardiovascular:      Rate and Rhythm: Normal rate and regular rhythm  Heart sounds: No murmur heard  Pulmonary:      Effort: Pulmonary effort is normal  No respiratory distress  Breath sounds: Normal breath sounds  Chest:      Chest wall: Tenderness present  Abdominal:      Palpations: Abdomen is soft  Tenderness: There is no abdominal tenderness  Musculoskeletal:      Cervical back: Neck supple  Skin:     General: Skin is warm and dry  Neurological:      Mental Status: He is alert           ED Medications  Medications   acetaminophen (TYLENOL) tablet 650 mg (650 mg Oral Given 7/22/21 1715)       Diagnostic Studies  Results Reviewed     Procedure Component Value Units Date/Time    Troponin I [[id number]]  (Normal) Collected: 07/22/21 1709    Lab Status: Final result Specimen: Blood from Arm, Right Updated: 07/22/21 1737     Troponin I <0 02 ng/mL     Basic metabolic panel [[id number]]  (Abnormal) Collected: 07/22/21 1709    Lab Status: Final result Specimen: Blood from Arm, Right Updated: 07/22/21 1733     Sodium 139 mmol/L      Potassium 3 4 mmol/L      Chloride 108 mmol/L      CO2 25 mmol/L      ANION GAP 6 mmol/L      BUN 11 mg/dL      Creatinine 0 90 mg/dL      Glucose 76 mg/dL      Calcium 9 0 mg/dL      eGFR 115 ml/min/1 73sq m     Narrative:      [place] guidelines for Chronic Kidney Disease (CKD):     Stage 1 with normal or high GFR (GFR > 90 mL/min/1 73 square meters)    Stage 2 Mild CKD (GFR = 60-89 mL/min/1 73 square meters)    Stage 3A Moderate CKD (GFR = 45-59 mL/min/1 73 square meters)    Stage 3B Moderate CKD (GFR = 30-44 mL/min/1 73 square meters)    Stage 4 Severe CKD (GFR = 15-29 mL/min/1 73 square meters)    Stage 5 End Stage CKD (GFR <15 mL/min/1 73 square meters)  Note: GFR calculation is accurate only with a steady state creatinine    CBC and differential [[id number]] Collected: 07/22/21 1709    Lab Status: Final result Specimen: Blood from Arm, Right Updated: 07/22/21 1719     WBC 7 33 Thousand/uL      RBC 4 42 Million/uL      Hemoglobin 13 1 g/dL      Hematocrit 38 4 %      MCV 87 fL      MCH 29 6 pg      MCHC 34 1 g/dL      RDW 12 4 %      MPV 12 5 fL      Platelets 610 Thousands/uL      nRBC 0 /100 WBCs      Neutrophils Relative 64 %      Immat GRANS % 0 %      Lymphocytes Relative 24 %      Monocytes Relative 8 %      Eosinophils Relative 4 %      Basophils Relative 0 %      Neutrophils Absolute 4 63 Thousands/µL      Immature Grans Absolute 0 01 Thousand/uL      Lymphocytes Absolute 1 74 Thousands/µL      Monocytes Absolute 0 60 Thousand/µL      Eosinophils Absolute 0 32 Thousand/µL      Basophils Absolute 0 03 Thousands/µL                  XR chest 2 views   ED Interpretation by Johnnie Rausch MD (07/22 1741)   No acute cardiopulmonary process      Final Result by Sid Pierson MD (07/22 2321)      No acute cardiopulmonary disease  Workstation performed: [id number]               Procedures  ECG 12 Lead Documentation Only    Date/Time: 7/22/2021 4:35 PM  Performed by: Johnnie Rausch MD  Authorized by: Johnnie Rausch MD     ECG reviewed by me, the ED Provider: yes    Patient location:  ED  Previous ECG:     Previous ECG:  Compared to current    Similarity:  No change    Comparison to cardiac monitor: Yes    Interpretation:     Interpretation: abnormal    Rate:     ECG rate assessment: bradycardic    Rhythm:     Rhythm: sinus bradycardia    Ectopy:     Ectopy: none    QRS:     QRS axis:  Normal  Conduction:     Conduction: normal    ST segments:     ST segments:  Normal  T waves:     T waves: normal            ED Course                                       MDM  Number of Diagnoses or Management Options  Atypical chest pain  Diagnosis management comments: 45-year-old male presented to the emergency department for evaluation of chest pain  On arrival the patient was awake, alert, oriented and in no acute distress  Initial vital signs show that the patient was bradycardic but otherwise his vital signs were stable  Reproducible left-sided chest wall tenderness was noted on examination  Workup done in the emergency department was unremarkable including a nonischemic EKG, negative troponin and chest x-ray with no acute cardiopulmonary process  The patient was treated symptomatically with Tylenol and a Lidoderm patch  On re-evaluation he reported improvement of his symptoms  The patient is appropriate for discharge at this time with recommendation to establish care with and follow-up with a PCP  Return precautions were discussed      Patient agrees with the plan for discharge and feels comfortable to go home with proper f/u  Advised to return for worsening or additional problems  Diagnostic tests were reviewed and questions answered  Diagnosis, care plan and treatment options were discussed  The patient understands instructions and will follow up as directed  Disposition  Final diagnoses:   Atypical chest pain     Time reflects when diagnosis was documented in both MDM as applicable and the Disposition within this note     Time User Action Codes Description Comment    7/22/2021  5:43 PM Shawn Maxwell Add [R07 89] Atypical chest pain       ED Disposition     ED Disposition Condition Date/Time Comment    Discharge Stable u Jul 22, 2021  5:43 PM Sergo Inez discharge to home/self care              Follow-up Information     Follow up With Specialties Details Why [address] [facility] Schedule an appointment as soon as possible for a visit   [address] [facility] [address] [facility] [id number]  [facility] [address], [address] [facility] [address], [place], [place], [address]   [address] [facility] Emergency Department Emergency Medicine Go to  If symptoms worsen [address] [facility] [address]  [address] [facility] [address] [facility] Emergency Department, [address], [place], [place], [facility] [address]          Discharge Medication List as of 7/22/2021  5:44 PM      CONTINUE these medications which have NOT CHANGED    Details   acetaminophen (TYLENOL) 325 mg tablet Take 650 mg by mouth every 6 (six) hours as needed for mild pain, Historical Med      !! albuterol (PROVENTIL HFA,VENTOLIN HFA) 90 mcg/act inhaler Inhale 2 puffs every 4 (four) hours as needed for wheezing, Starting Thu 5/20/2021, Normal      !! albuterol (VENTOLIN HFA) 90 mcg/act inhaler Starting Mon 5/22/2017, Historical Med      fluticasone (FLONASE) 50 mcg/act nasal spray 1 spray into each nostril daily, Starting Thu 5/20/2021, Normal       !! - Potential duplicate medications found  Please discuss with provider  No discharge procedures on file  PDMP Review     None           ED Provider  Attending physically available and evaluated [place] I managed the patient along with the ED Attending      Electronically Signed by         Antoinette Ocampo MD  07/23/21 5320

## 2021-08-04 ENCOUNTER — OFFICE VISIT (OUTPATIENT)
Dept: INTERNAL MEDICINE CLINIC | Facility: CLINIC | Age: 31
End: 2021-08-04

## 2021-08-04 VITALS
BODY MASS INDEX: 29.04 KG/M2 | TEMPERATURE: 98.8 F | HEIGHT: 68 IN | SYSTOLIC BLOOD PRESSURE: 114 MMHG | WEIGHT: 191.6 LBS | HEART RATE: 64 BPM | DIASTOLIC BLOOD PRESSURE: 69 MMHG

## 2021-08-04 DIAGNOSIS — Z13.220 SCREENING, LIPID: ICD-10-CM

## 2021-08-04 DIAGNOSIS — Z13.1 SCREENING FOR DIABETES MELLITUS: ICD-10-CM

## 2021-08-04 DIAGNOSIS — R07.89 CHEST PAIN, MUSCULOSKELETAL: Primary | ICD-10-CM

## 2021-08-04 DIAGNOSIS — Z11.4 SCREENING FOR HIV (HUMAN IMMUNODEFICIENCY VIRUS): ICD-10-CM

## 2021-08-04 DIAGNOSIS — Z11.59 NEED FOR HEPATITIS C SCREENING TEST: ICD-10-CM

## 2021-08-04 DIAGNOSIS — E87.6 HYPOKALEMIA: ICD-10-CM

## 2021-08-04 PROBLEM — M77.51 BURSITIS OF RIGHT FOOT: Status: RESOLVED | Noted: 2020-01-21 | Resolved: 2021-08-04

## 2021-08-04 PROBLEM — J30.1 SEASONAL ALLERGIC RHINITIS DUE TO POLLEN: Status: ACTIVE | Noted: 2017-06-06

## 2021-08-04 PROBLEM — J45.909 ASTHMA: Status: ACTIVE | Noted: 2017-06-06

## 2021-08-04 PROBLEM — S92.214A: Status: RESOLVED | Noted: 2018-10-29 | Resolved: 2021-08-04

## 2021-08-04 PROCEDURE — 99202 OFFICE O/P NEW SF 15 MIN: CPT | Performed by: PHYSICIAN ASSISTANT

## 2021-08-04 PROCEDURE — 1036F TOBACCO NON-USER: CPT | Performed by: PHYSICIAN ASSISTANT

## 2021-08-04 PROCEDURE — 3725F SCREEN DEPRESSION PERFORMED: CPT | Performed by: PHYSICIAN ASSISTANT

## 2021-08-04 PROCEDURE — 3008F BODY MASS INDEX DOCD: CPT | Performed by: PHYSICIAN ASSISTANT

## 2021-08-04 NOTE — PROGRESS NOTES
Assessment/Plan:      Diagnoses and all orders for this visit:    Chest pain, musculoskeletal    Hypokalemia  -     Basic metabolic panel; Future    Screening, lipid  -     Lipid panel; Future    Screening for diabetes mellitus  -     Basic metabolic panel; Future  -     Hemoglobin A1C; Future    Need for hepatitis C screening test  -     Hepatitis C antibody; Future    Screening for HIV (human immunodeficiency virus)  -     HIV 1/2 Antigen/Antibody (4th Generation) w Reflex [facility]; Future      61-year-old male presenting today as a new patient to establish with our office and ED follow-up for chest pain  ED note from 07/22 reviewed personally by me  He did have troponin, chest x-ray, EKG as well as other blood work that was all unremarkable aside from mild hypokalemia at 3 4  Since discharge patient reports overall the pain is better, he still gets occasional pain varying severity with no known triggers  Due to his negative cardiac workup, no respiratory symptoms I believe this is most likely musculoskeletal, also considering it is reproducible somewhat with palpation  Patient understands that he should perform gentle stretches to his neck, spine, extremities and chest wall, moist heat compresses if needed, Tylenol as needed for pain control  Can also consider over-the-counter lidocaine patch if needed which was given in the emergency room and he reported some improvement  We will monitor for now, no further workup needed but he was advised to contact the office with persistent or worsening symptoms or new related symptoms  Will plan a follow-up with patient in about 2-3 months to follow-up for the chest pain but also for an annual physical   Patient is agreeable to getting routine blood work done to be completed prior to the visit  Will order lipid panel as well as repeat BMP to recheck potassium  Also will order A1c to screen for diabetes considering patient reports family history    Also will order hep C and HIV screening  Next appointment will also address healthcare maintenance in more detail  Chief Complaint   Patient presents with   [facility]      f/u on er visit for chest pain- still has the pain every once and awhile- comes and goes       Subjective:     Patient ID: Ruiz Stover is a 35 y o  male  29y/o male here today as a new patient and ED f/u for chest pain  He was seen in ED on 7/22 for CP x 2 days, no other sxs  States the pain overall is better, but severity comes and goes  States the pain sometimes is with laughing or coughing, sometimes with walking  No worse after eating  Pain present for an hour at onset  states pain present lower left chest under breast area, as well as central lower chest  Not in epigastric region  He denies any triggers to pain  He takes tylenol for pain which does help a little  He is allergic to NSAIDS aspirin and ibuprofen (hives)  Haris radiation of pain, no SOB, dizziness, coughing  Denies N/V or abd pain, no diarrhea  Haris any heartburn, burping or acid reflux  He does have asthma, intermittent  States mainly induced with pollen and allergies  He also uses claritin as needed  He otherwise denies any medica conditions  He states he generally feels well and has no specific concerns today  Review of Systems   Constitutional: Negative  HENT: Negative  Eyes: Negative  Respiratory: Negative  Cardiovascular:        As in HPI   Gastrointestinal: Negative  Genitourinary: Negative  Musculoskeletal: Negative  Skin: Negative  Neurological: Negative  Psychiatric/Behavioral: Negative  The following portions of the patient's history were reviewed and updated as appropriate: allergies, current medications, past family history, past medical history, past social history, past surgical history and problem list       Objective:     Physical Exam  Vitals reviewed     Constitutional: General: He is not in acute distress  Appearance: Normal appearance  He is not ill-appearing or toxic-appearing  HENT:      Head: Normocephalic and atraumatic  Neck:      Thyroid: No thyroid tenderness  Vascular: Normal carotid pulses  No carotid bruit  Trachea: Phonation normal    Cardiovascular:      Rate and Rhythm: Normal rate and regular rhythm  Pulses: Normal pulses  Heart sounds: Normal heart sounds  Pulmonary:      Effort: Pulmonary effort is normal       Breath sounds: Normal breath sounds  Chest:      Chest wall: Tenderness (very minimal, inferior to left breast and lower sternal region) present  No deformity, swelling or crepitus  Abdominal:      General: Abdomen is flat  Bowel sounds are normal       Palpations: Abdomen is soft  Tenderness: There is no abdominal tenderness  Musculoskeletal:      Cervical back: Neck supple  Right lower leg: No edema  Left lower leg: No edema  Lymphadenopathy:      Head:      Right side of head: No submandibular or tonsillar adenopathy  Left side of head: No submandibular or tonsillar adenopathy  Neurological:      Mental Status: He is alert and oriented to person, place, and time  Psychiatric:         Mood and Affect: Mood normal          Speech: Speech normal          Behavior: Behavior normal  Behavior is cooperative           Vitals:    08/04/21 1452   BP: 114/69   BP Location: Left arm   Patient Position: Sitting   Cuff Size: Standard   Pulse: 64   Temp: 98 8 °F (37 1 °C)   TempSrc: Temporal   Weight: 86 9 kg (191 lb 9 6 oz)   Height: 5' 8" (1 727 m)     PHQ-9 Depression Screening    PHQ-9:   Frequency of the following problems over the past two weeks:      Little interest or pleasure in doing things: 0 - not at all  Feeling down, depressed, or hopeless: 0 - not at all  PHQ-2 Score: 0

## 2021-09-03 ENCOUNTER — APPOINTMENT (OUTPATIENT)
Dept: LAB | Facility: CLINIC | Age: 31
End: 2021-09-03
Payer: COMMERCIAL

## 2021-09-03 DIAGNOSIS — Z13.220 SCREENING, LIPID: ICD-10-CM

## 2021-09-03 DIAGNOSIS — Z11.4 SCREENING FOR HIV (HUMAN IMMUNODEFICIENCY VIRUS): ICD-10-CM

## 2021-09-03 DIAGNOSIS — Z13.1 SCREENING FOR DIABETES MELLITUS: ICD-10-CM

## 2021-09-03 DIAGNOSIS — E87.6 HYPOKALEMIA: ICD-10-CM

## 2021-09-03 DIAGNOSIS — Z11.59 NEED FOR HEPATITIS C SCREENING TEST: ICD-10-CM

## 2021-09-03 LAB
ANION GAP SERPL CALCULATED.3IONS-SCNC: 5 MMOL/L (ref 4–13)
BUN SERPL-MCNC: 11 MG/DL (ref 5–25)
CALCIUM SERPL-MCNC: 9 MG/DL (ref 8.3–10.1)
CHLORIDE SERPL-SCNC: 107 MMOL/L (ref 100–108)
CHOLEST SERPL-MCNC: 98 MG/DL (ref 50–200)
CO2 SERPL-SCNC: 28 MMOL/L (ref 21–32)
CREAT SERPL-MCNC: 0.94 MG/DL (ref 0.6–1.3)
EST. AVERAGE GLUCOSE BLD GHB EST-MCNC: 111 MG/DL
GFR SERPL CREATININE-BSD FRML MDRD: 108 ML/MIN/1.73SQ M
GLUCOSE P FAST SERPL-MCNC: 91 MG/DL (ref 65–99)
HBA1C MFR BLD: 5.5 %
HCV AB SER QL: NORMAL
HDLC SERPL-MCNC: 42 MG/DL
LDLC SERPL CALC-MCNC: 48 MG/DL (ref 0–100)
NONHDLC SERPL-MCNC: 56 MG/DL
POTASSIUM SERPL-SCNC: 4 MMOL/L (ref 3.5–5.3)
SODIUM SERPL-SCNC: 140 MMOL/L (ref 136–145)
TRIGL SERPL-MCNC: 42 MG/DL

## 2021-09-03 PROCEDURE — 80061 LIPID PANEL: CPT

## 2021-09-03 PROCEDURE — 36415 COLL VENOUS BLD VENIPUNCTURE: CPT

## 2021-09-03 PROCEDURE — 86803 HEPATITIS C AB TEST: CPT

## 2021-09-03 PROCEDURE — 87389 HIV-1 AG W/HIV-1&-2 AB AG IA: CPT

## 2021-09-03 PROCEDURE — 83036 HEMOGLOBIN GLYCOSYLATED A1C: CPT

## 2021-09-03 PROCEDURE — 80048 BASIC METABOLIC PNL TOTAL CA: CPT

## 2021-09-05 LAB — HIV 1+2 AB+HIV1 P24 AG SERPL QL IA: NORMAL

## 2022-05-19 DIAGNOSIS — Z76.0 MEDICATION REFILL: ICD-10-CM

## 2022-05-19 RX ORDER — ALBUTEROL SULFATE 90 UG/1
2 AEROSOL, METERED RESPIRATORY (INHALATION) EVERY 4 HOURS PRN
Qty: 6.7 G | Refills: 0 | Status: SHIPPED | OUTPATIENT
Start: 2022-05-19 | End: 2022-06-10

## 2022-05-19 NOTE — TELEPHONE ENCOUNTER
PT IS COMPLETELY OUT  PLEASE SEND ASAP       Pt has a txfr appt scheduled for 8/10/22 w/ Dr Breanne Serrato

## 2022-06-10 DIAGNOSIS — Z76.0 MEDICATION REFILL: ICD-10-CM

## 2022-06-10 RX ORDER — ALBUTEROL SULFATE 90 UG/1
AEROSOL, METERED RESPIRATORY (INHALATION)
Qty: 18 G | Refills: 3 | Status: SHIPPED | OUTPATIENT
Start: 2022-06-10

## 2022-08-03 ENCOUNTER — RA CDI HCC (OUTPATIENT)
Dept: OTHER | Facility: HOSPITAL | Age: 32
End: 2022-08-03

## 2022-08-03 NOTE — PROGRESS NOTES
[facility]  coding [facility]       Chart reviewed, no opportunity found: CHART REVIEWED, NO OPPORTUNITY FOUND        Patients Insurance        Commercial Insurance: [address] [place] [address]

## 2022-08-09 ENCOUNTER — OFFICE VISIT (OUTPATIENT)
Dept: INTERNAL MEDICINE CLINIC | Facility: CLINIC | Age: 32
End: 2022-08-09

## 2022-08-09 VITALS
SYSTOLIC BLOOD PRESSURE: 116 MMHG | OXYGEN SATURATION: 98 % | DIASTOLIC BLOOD PRESSURE: 73 MMHG | WEIGHT: 197.4 LBS | BODY MASS INDEX: 30.01 KG/M2 | HEART RATE: 55 BPM | TEMPERATURE: 98.3 F

## 2022-08-09 DIAGNOSIS — Z23 NEED FOR PNEUMOCOCCAL VACCINATION: ICD-10-CM

## 2022-08-09 DIAGNOSIS — Z00.00 ANNUAL PHYSICAL EXAM: Primary | ICD-10-CM

## 2022-08-09 PROCEDURE — 99395 PREV VISIT EST AGE 18-39: CPT | Performed by: INTERNAL MEDICINE

## 2022-08-09 PROCEDURE — 90471 IMMUNIZATION ADMIN: CPT | Performed by: INTERNAL MEDICINE

## 2022-08-09 PROCEDURE — 90677 PCV20 VACCINE IM: CPT | Performed by: INTERNAL MEDICINE

## 2022-08-09 PROCEDURE — 3725F SCREEN DEPRESSION PERFORMED: CPT | Performed by: INTERNAL MEDICINE

## 2022-08-09 NOTE — PROGRESS NOTES
ADULT ANNUAL [address] [place]    NAME: Rossy Eubanks  AGE: 28 y o  SEX: male  : 1990     DATE: 2022     Assessment and Plan:     Problem List Items Addressed This Visit    None     Visit Diagnoses     Annual physical exam    -  Primary    Need for pneumococcal vaccination        Relevant Orders    Pneumococcal Conjugate Vaccine 20-valent (Pcv20)          Immunizations and preventive care screenings were discussed with patient today  Appropriate education was printed on patient's after visit summary  Counseling:  Alcohol/drug use: discussed moderation in alcohol intake, the recommendations for healthy alcohol use, and avoidance of illicit drug use  Dental Health: discussed importance of regular tooth brushing, flossing, and dental visits  Sexual health: discussed sexually transmitted diseases, partner selection, use of condoms, avoidance of unintended pregnancy, and contraceptive alternatives  · Exercise: the importance of regular exercise/physical activity was discussed  Recommend exercise 3-5 times per week for at least 30 minutes  No follow-ups on file  Chief Complaint:     Chief Complaint   Patient presents with    Follow-up      History of Present Illness:     Adult Annual Physical   Patient here for a comprehensive physical exam  The patient reports no problems  Diet and Physical Activity  · Diet/Nutrition: well balanced diet, low calorie diet and limited fruits/vegetables  · Exercise: walking and 3-4 times a week on average  Depression Screening  PHQ-2/9 Depression Screening    Little interest or pleasure in doing things: 0 - not at all  Feeling down, depressed, or hopeless: 0 - not at all  PHQ-2 Score: 0  PHQ-2 Interpretation: Negative depression screen       General Health  · Sleep: gets 7-8 hours of sleep on average  · Hearing: normal - bilateral   · Vision: no vision problems  · Dental: no dental visits for >1 year   Health  · History of STDs?: no      Review of Systems:     Review of Systems   Constitutional: Negative for chills, fatigue and unexpected weight change  HENT: Negative for drooling  Eyes: Negative for pain and visual disturbance  Respiratory: Negative for cough, chest tightness and wheezing  Cardiovascular: Negative for chest pain and palpitations  Gastrointestinal: Negative for abdominal pain, nausea and vomiting  Endocrine: Negative for polydipsia and polyuria  Genitourinary: Negative for decreased urine volume and dysuria  Neurological: Negative for facial asymmetry, light-headedness and headaches  Psychiatric/Behavioral: Negative for agitation  Past Medical History:     Past Medical History:   Diagnosis Date    Allergic rhinitis     Asthma     Bursitis of right foot 1/21/2020    Nondisplaced fracture of cuboid bone of right foot, initial encounter for closed fracture 10/29/2018      Past Surgical History:     History reviewed  No pertinent surgical history     Social History:     Social History     Socioeconomic History    Marital status: Single     Spouse name: None    Number of children: None    Years of education: None    Highest education level: None   Occupational History    None   Tobacco Use    Smoking status: Never Smoker    Smokeless tobacco: Never Used   Vaping Use    Vaping Use: Never used   Substance and Sexual Activity    Alcohol use: No    Drug use: No    Sexual activity: Yes     Partners: Female   Other Topics Concern    None   Social History Narrative    None     Social Determinants of Health     Financial Resource Strain: Low Risk     Difficulty of Paying Living Expenses: Not hard at all   Food Insecurity: No Food Insecurity    Worried About Running Out of Food in the Last Year: Never true    Mele of Food in the Last Year: Never true   Transportation Needs: No Transportation Needs    Lack of Transportation (Medical): No    Lack of Transportation (Non-Medical): No   Physical Activity: Not on file   Stress: Not on file   Social Connections: Not on file   Intimate Partner Violence: Not on file   Housing Stability: Low Risk     Unable to Pay for Housing in the Last Year: No    Number of Places Lived in the Last Year: 1    Unstable Housing in the Last Year: No      Family History:     Family History   Problem Relation Age of Onset    Hypertension Mother       Current Medications:     Current Outpatient Medications   Medication Sig Dispense Refill    acetaminophen (TYLENOL) 325 mg tablet Take 650 mg by mouth every 6 (six) hours as needed for mild pain      albuterol (PROVENTIL HFA,VENTOLIN HFA) 90 mcg/act inhaler TAKE 2 PUFFS BY MOUTH EVERY 4 HOURS AS NEEDED FOR WHEEZE 18 g 3     No current facility-administered medications for this visit  Allergies: Allergies   Allergen Reactions    Aspirin Hives    Ibuprofen Hives    Penicillins Hives      Physical Exam:     /73 (BP Location: Left arm, Patient Position: Sitting, Cuff Size: Large)   Pulse 55   Temp 98 3 °F (36 8 °C) (Temporal)   Wt 89 5 kg (197 lb 6 4 oz)   SpO2 98%   BMI 30 01 kg/m²     Physical Exam  Constitutional:       General: He is not in acute distress  Appearance: He is not ill-appearing, toxic-appearing or diaphoretic  HENT:      Head: No Asencio's sign  Cardiovascular:      Rate and Rhythm: Normal rate and regular rhythm  Pulses: Normal pulses  Heart sounds: Normal heart sounds  No gallop  Pulmonary:      Effort: Pulmonary effort is normal       Breath sounds: Normal breath sounds  No rales  Chest:      Chest wall: No tenderness  Abdominal:      General: Bowel sounds are normal       Tenderness: There is no right CVA tenderness or left CVA tenderness  Musculoskeletal:         General: No deformity  Right lower leg: No edema  Left lower leg: No edema     Neurological:      General: No focal deficit present  Mental Status: He is oriented to person, place, and time     Psychiatric:         Mood and Affect: Mood normal          Behavior: Behavior normal           Edwige John MD   [address]

## 2022-11-30 ENCOUNTER — APPOINTMENT (EMERGENCY)
Dept: RADIOLOGY | Facility: HOSPITAL | Age: 32
End: 2022-11-30

## 2022-11-30 ENCOUNTER — HOSPITAL ENCOUNTER (EMERGENCY)
Facility: HOSPITAL | Age: 32
Discharge: HOME | End: 2022-11-30
Attending: EMERGENCY MEDICINE

## 2022-11-30 VITALS
SYSTOLIC BLOOD PRESSURE: 151 MMHG | HEART RATE: 74 BPM | OXYGEN SATURATION: 98 % | DIASTOLIC BLOOD PRESSURE: 67 MMHG | RESPIRATION RATE: 16 BRPM | TEMPERATURE: 98.3 F

## 2022-11-30 DIAGNOSIS — R10.31 RLQ ABDOMINAL PAIN: Primary | ICD-10-CM

## 2022-11-30 LAB
ANION GAP SERPL CALCULATED.3IONS-SCNC: 6 MMOL/L (ref 4–13)
BUN SERPL-MCNC: 13 MG/DL (ref 5–25)
CALCIUM SERPL-MCNC: 8.8 MG/DL (ref 8.3–10.1)
CHLORIDE SERPL-SCNC: 108 MMOL/L (ref 96–108)
CO2 SERPL-SCNC: 25 MMOL/L (ref 21–32)
CREAT SERPL-MCNC: 1.07 MG/DL (ref 0.6–1.3)
GFR SERPL CREATININE-BSD FRML MDRD: 92 ML/MIN/1.73SQ M
GLUCOSE SERPL-MCNC: 81 MG/DL (ref 65–140)
POTASSIUM SERPL-SCNC: 3.9 MMOL/L (ref 3.5–5.3)
SODIUM SERPL-SCNC: 139 MMOL/L (ref 135–147)

## 2022-11-30 RX ORDER — ACETAMINOPHEN 325 MG/1
975 TABLET ORAL ONCE
Status: COMPLETED | OUTPATIENT
Start: 2022-11-30 | End: 2022-11-30

## 2022-11-30 RX ADMIN — ACETAMINOPHEN 975 MG: 325 TABLET ORAL at 12:03

## 2022-11-30 RX ADMIN — IOHEXOL 100 ML: 350 INJECTION, SOLUTION INTRAVENOUS at 10:38

## 2022-11-30 NOTE — ED ATTENDING ATTESTATION
11/30/2022  IPadmini, DO, saw and evaluated the patient  I have discussed the patient with the resident/non-physician practitioner and agree with the resident's/non-physician practitioner's findings, Plan of Care, and MDM as documented in the resident's/non-physician practitioner's note, except where noted  All available labs and Radiology studies were reviewed  I was present for key portions of any procedure(s) performed by the resident/non-physician practitioner and I was immediately available to provide assistance  At this point I agree with the current assessment done in the Emergency Department  I have conducted an independent evaluation of this patient a history and physical is as follows:    33 yom with rlq pain  osnet yesterday  Felt lump  Worse with moving/exertion  No assoc sx  On exam there is a small mass  No overlying skin changes  A/P: abd pain hernia?  Ct scan    ED Course         Critical Care Time  Procedures

## 2022-11-30 NOTE — DISCHARGE INSTRUCTIONS
Please take Tylenol for your symptoms  Please return to the emergency department if develop any new or concerning symptoms including vomiting, severe abdominal pain, or high fever

## 2022-11-30 NOTE — ED PROVIDER NOTES
History  Chief Complaint   Patient presents with   • Abdominal Pain     Pt states he found a painful lump in R lower quadrant yesterday, states pain is worse with movement, denies n/v or diarrhea     Patient is a 58-year-old male is otherwise healthy and presents with abdominal pain  Patient said he had onset of right lower quadrant abdominal pain yesterday  He also says that he can feel a mass where he feels the pain  He says the pain is worse when he moves around exerts himself  He tried taking Tylenol last night but the pain persisted today  He denies any other associated symptoms including nausea, vomiting, diarrhea, constipation, blood in stool, or fevers  Denies any recent diet changes  Prior to Admission Medications   Prescriptions Last Dose Informant Patient Reported? Taking?   acetaminophen (TYLENOL) 325 mg tablet   Yes No   Sig: Take 650 mg by mouth every 6 (six) hours as needed for mild pain   albuterol (PROVENTIL HFA,VENTOLIN HFA) 90 mcg/act inhaler   No No   Sig: TAKE 2 PUFFS BY MOUTH EVERY 4 HOURS AS NEEDED FOR WHEEZE      Facility-Administered Medications: None       Past Medical History:   Diagnosis Date   • Allergic rhinitis    • Asthma    • Bursitis of right foot 1/21/2020   • Nondisplaced fracture of cuboid bone of right foot, initial encounter for closed fracture 10/29/2018       History reviewed  No pertinent surgical history  Family History   Problem Relation Age of Onset   • Hypertension Mother      I have reviewed and agree with the history as documented      E-Cigarette/Vaping   • E-Cigarette Use Never User      E-Cigarette/Vaping Substances   • Nicotine No    • THC No    • CBD No    • Flavoring No    • Other No    • Unknown No      Social History     Tobacco Use   • Smoking status: Never   • Smokeless tobacco: Never   Vaping Use   • Vaping Use: Never used   Substance Use Topics   • Alcohol use: No   • Drug use: No        Review of Systems   Constitutional: Negative for chills and fever  HENT: Negative for congestion and rhinorrhea  Eyes: Negative for pain and redness  Respiratory: Negative for shortness of breath and wheezing  Cardiovascular: Negative for chest pain and palpitations  Gastrointestinal: Positive for abdominal pain  Negative for blood in stool, constipation, diarrhea, nausea and vomiting  Genitourinary: Negative for dysuria and flank pain  Musculoskeletal: Negative for back pain and neck pain  Skin: Negative for pallor and rash  Neurological: Negative for dizziness and headaches  Psychiatric/Behavioral: Negative for behavioral problems and confusion  All other systems reviewed and are negative  Physical Exam  ED Triage Vitals [11/30/22 0809]   Temperature Pulse Respirations Blood Pressure SpO2   98 3 °F (36 8 °C) 74 16 151/67 98 %      Temp Source Heart Rate Source Patient Position - Orthostatic VS BP Location FiO2 (%)   Oral Monitor Sitting Right arm --      Pain Score       --             Orthostatic Vital Signs  Vitals:    11/30/22 0809   BP: 151/67   Pulse: 74   Patient Position - Orthostatic VS: Sitting       Physical Exam  Vitals and nursing note reviewed  Constitutional:       General: He is not in acute distress  Appearance: He is well-developed  He is obese  He is not ill-appearing, toxic-appearing or diaphoretic  HENT:      Head: Normocephalic and atraumatic  Cardiovascular:      Rate and Rhythm: Normal rate and regular rhythm  Heart sounds: Normal heart sounds  No murmur heard  No friction rub  No gallop  Pulmonary:      Effort: Pulmonary effort is normal  No respiratory distress  Breath sounds: Normal breath sounds  No wheezing or rales  Abdominal:      General: Abdomen is flat  Bowel sounds are normal  There is no distension  There are no signs of injury  Palpations: Abdomen is soft  There is mass (Firm, about 1-2 cm in size the right lower quadrant, does not protrude with Valsalva maneuver)  Tenderness: There is abdominal tenderness in the right lower quadrant  There is no guarding or rebound  Negative signs include McBurney's sign  Skin:     General: Skin is warm and dry  Coloration: Skin is not pale  Findings: No rash  Neurological:      Mental Status: He is alert  Psychiatric:         Mood and Affect: Mood normal  Mood is not anxious or depressed  Behavior: Behavior normal          ED Medications  Medications   iohexol (OMNIPAQUE) 350 MG/ML injection (SINGLE-DOSE) 100 mL (100 mL Intravenous Given 11/30/22 1038)   acetaminophen (TYLENOL) tablet 975 mg (975 mg Oral Given 11/30/22 1203)       Diagnostic Studies  Results Reviewed     Procedure Component Value Units Date/Time    Basic metabolic panel [[id number]] Collected: 11/30/22 0850    Lab Status: Final result Specimen: Blood from Arm, Left Updated: 11/30/22 0913     Sodium 139 mmol/L      Potassium 3 9 mmol/L      Chloride 108 mmol/L      CO2 25 mmol/L      ANION GAP 6 mmol/L      BUN 13 mg/dL      Creatinine 1 07 mg/dL      Glucose 81 mg/dL      Calcium 8 8 mg/dL      eGFR 92 ml/min/1 73sq m     Narrative:      [place] guidelines for Chronic Kidney Disease (CKD):   •  Stage 1 with normal or high GFR (GFR > 90 mL/min/1 73 square meters)  •  Stage 2 Mild CKD (GFR = 60-89 mL/min/1 73 square meters)  •  Stage 3A Moderate CKD (GFR = 45-59 mL/min/1 73 square meters)  •  Stage 3B Moderate CKD (GFR = 30-44 mL/min/1 73 square meters)  •  Stage 4 Severe CKD (GFR = 15-29 mL/min/1 73 square meters)  •  Stage 5 End Stage CKD (GFR <15 mL/min/1 73 square meters)  Note: GFR calculation is accurate only with a steady state creatinine                 CT abdomen pelvis with contrast   Final Result by Marilee Milner MD (11/30 7421)      No evidence of acute abdominopelvic process  No findings to correlate with reported right lower quadrant palpable abnormality              Workstation performed: [id number]               Procedures  Procedures      ED Course                             SBIRT 20yo+    Flowsheet Row Most Recent Value   SBIRT (23 yo +)    In order to provide better care to our patients, we are screening all of our patients for alcohol and drug use  Would it be okay to ask you these screening questions? No Filed at: 11/30/2022 9526   MIKI: How many times in the past year have you    Used an illegal drug or used a prescription medication for non-medical reasons? Never Filed at: 11/30/2022 0851                MDM  Number of Diagnoses or Management Options  RLQ abdominal pain: new and does not require workup  Diagnosis management comments: Patient is a 26-year-old male who presents with right lower quadrant pain  Differential diagnosis includes but is not limited to abdominal cysts, no hernia, abdominal mass  Will get BMP CT abdomen pelvis with contrast to assess  CT does not show clear cause for patient's pain or the mass  Potentially due to a strained muscle  Discussed results with patient and gave him return precautions  Amount and/or Complexity of Data Reviewed  Clinical lab tests: ordered and reviewed  Tests in the radiology section of CPT®: ordered and reviewed  Review and summarize past medical records: yes  Discuss the patient with other providers: yes  Independent visualization of images, tracings, or specimens: yes    Patient Progress  Patient progress: improved      Disposition  Final diagnoses:   RLQ abdominal pain     Time reflects when diagnosis was documented in both MDM as applicable and the Disposition within this note     Time User Action Codes Description Comment    11/30/2022 11:57 AM Deja Gonsalez Add [R10 31] RLQ abdominal pain       ED Disposition     ED Disposition   Discharge    Condition   Good    Date/Time   Wed Nov 30, 2022 11:56 AM    Swati Ma discharge to home/self care                 Follow-up Information     Follow up With Specialties Details Why Contact Info Additional [address] Emergency Department Emergency Medicine Go to  If symptoms worsen or if you have any other specific concerns [address] [phone]   [address] Emergency Department, [address] Family Medicine Call today To make appointment for reevaluation if you do not have a PCP [facility] [phone]  [address], [address] [place], [place], [facility]          Discharge Medication List as of 11/30/2022 11:57 AM      CONTINUE these medications which have NOT CHANGED    Details   acetaminophen (TYLENOL) 325 mg tablet Take 650 mg by mouth every 6 (six) hours as needed for mild pain, Historical Med      albuterol (PROVENTIL HFA,VENTOLIN HFA) 90 mcg/act inhaler TAKE 2 PUFFS BY MOUTH EVERY 4 HOURS AS NEEDED FOR WHEEZE, Normal           No discharge procedures on file  PDMP Review     None           ED Provider  Attending physically available and evaluated Stallworth Wyatt RAY managed the patient along with the ED Attending      Electronically Signed by         Mahnaz Rascon MD  11/30/22 2164

## 2023-06-13 ENCOUNTER — HOSPITAL ENCOUNTER (EMERGENCY)
Facility: HOSPITAL | Age: 33
Discharge: HOME | End: 2023-06-13
Attending: EMERGENCY MEDICINE | Admitting: EMERGENCY MEDICINE
Payer: COMMERCIAL

## 2023-06-13 VITALS
SYSTOLIC BLOOD PRESSURE: 132 MMHG | TEMPERATURE: 98.5 F | HEART RATE: 68 BPM | DIASTOLIC BLOOD PRESSURE: 86 MMHG | RESPIRATION RATE: 18 BRPM | OXYGEN SATURATION: 99 %

## 2023-06-13 DIAGNOSIS — L02.93 CARBUNCLES: Primary | ICD-10-CM

## 2023-06-13 DIAGNOSIS — L02.92 FURUNCULOSIS OF SKIN: ICD-10-CM

## 2023-06-13 RX ORDER — CEPHALEXIN 500 MG/1
500 CAPSULE ORAL EVERY 6 HOURS SCHEDULED
Qty: 28 CAPSULE | Refills: 0 | Status: SHIPPED | OUTPATIENT
Start: 2023-06-13 | End: 2023-06-20

## 2023-06-13 RX ORDER — CEPHALEXIN 250 MG/1
500 CAPSULE ORAL ONCE
Status: COMPLETED | OUTPATIENT
Start: 2023-06-13 | End: 2023-06-13

## 2023-06-13 RX ADMIN — MUPIROCIN: 20 OINTMENT TOPICAL at 19:32

## 2023-06-13 RX ADMIN — CEPHALEXIN 500 MG: 250 CAPSULE ORAL at 19:32

## 2023-06-13 NOTE — ED PROVIDER NOTES
History  Chief Complaint   Patient presents with   • Rash     Noticed rash on nose today  Denies pain, slightly itchy     69-year-old male comes in for evaluation of rash  Patient states he noticed rash on the end of his nose today appears to be raised to bite pustules that are itchy  Patient denies any previous similar episodes  Patient denies any fever chills they are not painful  They are not anywhere else on his body  History provided by:  Patient   used: No    Rash  Location:  Face  Facial rash location:  Nose  Quality: itchiness    Severity:  Mild  Onset quality:  Sudden  Timing:  Constant  Progression:  Unchanged  Chronicity:  New  Context: not diapers, not medications and not pregnancy    Ineffective treatments:  None tried  Associated symptoms: no abdominal pain, no fever, no joint pain, no myalgias, no shortness of breath, no sore throat, not vomiting and not wheezing        Prior to Admission Medications   Prescriptions Last Dose Informant Patient Reported? Taking?   acetaminophen (TYLENOL) 325 mg tablet   Yes No   Sig: Take 650 mg by mouth every 6 (six) hours as needed for mild pain   albuterol (PROVENTIL HFA,VENTOLIN HFA) 90 mcg/act inhaler   No No   Sig: TAKE 2 PUFFS BY MOUTH EVERY 4 HOURS AS NEEDED FOR WHEEZE      Facility-Administered Medications: None       Past Medical History:   Diagnosis Date   • Allergic rhinitis    • Asthma    • Bursitis of right foot 1/21/2020   • Nondisplaced fracture of cuboid bone of right foot, initial encounter for closed fracture 10/29/2018       History reviewed  No pertinent surgical history  Family History   Problem Relation Age of Onset   • Hypertension Mother      I have reviewed and agree with the history as documented      E-Cigarette/Vaping   • E-Cigarette Use Never User      E-Cigarette/Vaping Substances   • Nicotine No    • THC No    • CBD No    • Flavoring No    • Other No    • Unknown No      Social History     Tobacco Use   • Smoking status: Never   • Smokeless tobacco: Never   Vaping Use   • Vaping Use: Never used   Substance Use Topics   • Alcohol use: No   • Drug use: No       Review of Systems   Constitutional: Negative for chills and fever  HENT: Negative for ear pain and sore throat  Eyes: Negative for pain and visual disturbance  Respiratory: Negative for cough, shortness of breath and wheezing  Cardiovascular: Negative for chest pain and palpitations  Gastrointestinal: Negative for abdominal pain and vomiting  Genitourinary: Negative for dysuria and hematuria  Musculoskeletal: Negative for arthralgias, back pain and myalgias  Skin: Positive for rash  Negative for color change  Neurological: Negative for seizures and syncope  All other systems reviewed and are negative  Physical Exam  Physical Exam  Vitals and nursing note reviewed  Constitutional:       General: He is not in acute distress  Appearance: He is well-developed  HENT:      Head: Normocephalic and atraumatic  Eyes:      Conjunctiva/sclera: Conjunctivae normal    Cardiovascular:      Rate and Rhythm: Normal rate and regular rhythm  Heart sounds: No murmur heard  Pulmonary:      Effort: Pulmonary effort is normal  No respiratory distress  Breath sounds: Normal breath sounds  Abdominal:      Palpations: Abdomen is soft  Tenderness: There is no abdominal tenderness  Musculoskeletal:         General: No swelling  Cervical back: Neck supple  Skin:     General: Skin is warm and dry  Capillary Refill: Capillary refill takes less than 2 seconds  Findings: Rash present  Rash is pustular  Neurological:      Mental Status: He is alert     Psychiatric:         Mood and Affect: Mood normal          Vital Signs  ED Triage Vitals [06/13/23 1751]   Temperature Pulse Respirations Blood Pressure SpO2   98 5 °F (36 9 °C) 68 18 132/86 99 %      Temp Source Heart Rate Source Patient Position - Orthostatic VS BP Location FiO2 (%)   Oral Monitor Sitting Right arm --      Pain Score       No Pain           Vitals:    06/13/23 1751   BP: 132/86   Pulse: 68   Patient Position - Orthostatic VS: Sitting         Visual Acuity      ED Medications  Medications   mupirocin (BACTROBAN) 2 % ointment (has no administration in time range)   cephalexin (KEFLEX) capsule 500 mg (has no administration in time range)       Diagnostic Studies  Results Reviewed     None                 No orders to display              Procedures  Procedures         ED Course                                             Medical Decision Making  Differential diagnosis includes but is not limited to furuncle carbuncle abscess contact dermatitis allergic reaction poison ivy    Furunculosis of skin: acute illness or injury  Risk  OTC drugs  Prescription drug management  Risk Details: Patient may take Motrin or Tylenol for pain  Patient may take Benadryl over-the-counter for itchiness  Patient was given antibiotic cream as well as oral antibiotics for infection  Disposition  Final diagnoses:   Furunculosis of skin     Time reflects when diagnosis was documented in both MDM as applicable and the Disposition within this note     Time User Action Codes Description Comment    6/13/2023  6:42 PM Glonathan Miranda Add [L02 91] Abscess     6/13/2023  6:44 PM Karlee Vargas Add [L02 92] Furunculosis of skin     6/13/2023  6:44 PM Nani Ganser K Modify [L02 92] Furunculosis of skin     6/13/2023  6:44 PM Glonathan Guzmanon Remove [L02 91] Abscess       ED Disposition     ED Disposition   Discharge    Condition   Stable    Date/Time   Tue Jun 13, 2023  6:42 PM    [address]  discharge to home/self care                 Follow-up Information     Follow up With Specialties Details Why Contact Info [facility] Schedule an appointment as soon as possible for a visit  As needed [phone] [address] [phone]  [address] , [place], [place] [facility] [place], [place], [address] [facility] [place] [facility] [place] [facility] Schedule an appointment as soon as possible for a visit  As needed [address] [phone] [facility] [place] [facility] [place], [facility] [address], [address], [address]     [phone]          Patient's Medications   Discharge Prescriptions    CEPHALEXIN (KEFLEX) 500 MG CAPSULE    Take 1 capsule (500 mg total) by mouth every 6 (six) hours for 7 days       Start Date: 6/13/2023 End Date: 6/20/2023       Order Dose: 500 mg       Quantity: 28 capsule    Refills: 0           PDMP Review     None          ED Provider  Electronically Signed by           Arminda Henson DO  06/13/23 1119

## 2023-06-14 ENCOUNTER — TELEPHONE (OUTPATIENT)
Dept: DERMATOLOGY | Facility: CLINIC | Age: 33
End: 2023-06-14

## 2023-06-14 NOTE — TELEPHONE ENCOUNTER
Rec'd call from patient  Seen in ED yesterday  Referred for facial carbuncles  Explained wait list process  He verbalized understanding  Created wait list for patient

## 2024-09-11 ENCOUNTER — TELEPHONE (OUTPATIENT)
Dept: INTERNAL MEDICINE CLINIC | Facility: CLINIC | Age: 34
End: 2024-09-11

## 2024-09-11 NOTE — TELEPHONE ENCOUNTER
Voicemail received - patient wanted to make an appt ( patient also needs physical )         Returned call  - [facility]

## 2024-09-12 ENCOUNTER — OFFICE VISIT (OUTPATIENT)
Dept: INTERNAL MEDICINE CLINIC | Facility: CLINIC | Age: 34
End: 2024-09-12

## 2024-09-12 ENCOUNTER — APPOINTMENT (OUTPATIENT)
Dept: LAB | Facility: CLINIC | Age: 34
End: 2024-09-12
Payer: COMMERCIAL

## 2024-09-12 VITALS
SYSTOLIC BLOOD PRESSURE: 123 MMHG | HEART RATE: 59 BPM | BODY MASS INDEX: 32.43 KG/M2 | TEMPERATURE: 97.7 F | HEIGHT: 68 IN | WEIGHT: 214 LBS | OXYGEN SATURATION: 99 % | DIASTOLIC BLOOD PRESSURE: 77 MMHG

## 2024-09-12 DIAGNOSIS — M25.511 ACUTE PAIN OF RIGHT SHOULDER: ICD-10-CM

## 2024-09-12 DIAGNOSIS — J45.20 MILD INTERMITTENT ASTHMA WITHOUT COMPLICATION: ICD-10-CM

## 2024-09-12 DIAGNOSIS — Z00.00 ANNUAL PHYSICAL EXAM: Primary | ICD-10-CM

## 2024-09-12 DIAGNOSIS — R53.83 OTHER FATIGUE: ICD-10-CM

## 2024-09-12 LAB
ALBUMIN SERPL BCG-MCNC: 4.8 G/DL (ref 3.5–5)
ALP SERPL-CCNC: 49 U/L (ref 34–104)
ALT SERPL W P-5'-P-CCNC: 19 U/L (ref 7–52)
ANION GAP SERPL CALCULATED.3IONS-SCNC: 10 MMOL/L (ref 4–13)
AST SERPL W P-5'-P-CCNC: 21 U/L (ref 13–39)
BILIRUB SERPL-MCNC: 0.52 MG/DL (ref 0.2–1)
BUN SERPL-MCNC: 14 MG/DL (ref 5–25)
CALCIUM SERPL-MCNC: 9.7 MG/DL (ref 8.4–10.2)
CHLORIDE SERPL-SCNC: 102 MMOL/L (ref 96–108)
CHOLEST SERPL-MCNC: 140 MG/DL
CO2 SERPL-SCNC: 27 MMOL/L (ref 21–32)
CREAT SERPL-MCNC: 1.1 MG/DL (ref 0.6–1.3)
ERYTHROCYTE [DISTWIDTH] IN BLOOD BY AUTOMATED COUNT: 12.5 % (ref 11.6–15.1)
ERYTHROCYTE [SEDIMENTATION RATE] IN BLOOD: 9 MM/HOUR (ref 0–14)
EST. AVERAGE GLUCOSE BLD GHB EST-MCNC: 105 MG/DL
GFR SERPL CREATININE-BSD FRML MDRD: 87 ML/MIN/1.73SQ M
GLUCOSE P FAST SERPL-MCNC: 86 MG/DL (ref 65–99)
HBA1C MFR BLD: 5.3 %
HCT VFR BLD AUTO: 42.3 % (ref 36.5–49.3)
HDLC SERPL-MCNC: 38 MG/DL
HGB BLD-MCNC: 14.6 G/DL (ref 12–17)
LDLC SERPL CALC-MCNC: 83 MG/DL (ref 0–100)
MCH RBC QN AUTO: 29.9 PG (ref 26.8–34.3)
MCHC RBC AUTO-ENTMCNC: 34.5 G/DL (ref 31.4–37.4)
MCV RBC AUTO: 87 FL (ref 82–98)
NONHDLC SERPL-MCNC: 102 MG/DL
PLATELET # BLD AUTO: 186 THOUSANDS/UL (ref 149–390)
PMV BLD AUTO: 12.4 FL (ref 8.9–12.7)
POTASSIUM SERPL-SCNC: 4 MMOL/L (ref 3.5–5.3)
PROT SERPL-MCNC: 7.4 G/DL (ref 6.4–8.4)
RBC # BLD AUTO: 4.88 MILLION/UL (ref 3.88–5.62)
SODIUM SERPL-SCNC: 139 MMOL/L (ref 135–147)
T4 FREE SERPL-MCNC: 0.96 NG/DL (ref 0.61–1.12)
TRIGL SERPL-MCNC: 97 MG/DL
TSH SERPL DL<=0.05 MIU/L-ACNC: 1.14 UIU/ML (ref 0.45–4.5)
WBC # BLD AUTO: 6.29 THOUSAND/UL (ref 4.31–10.16)

## 2024-09-12 PROCEDURE — 85652 RBC SED RATE AUTOMATED: CPT

## 2024-09-12 PROCEDURE — 85027 COMPLETE CBC AUTOMATED: CPT

## 2024-09-12 PROCEDURE — 36415 COLL VENOUS BLD VENIPUNCTURE: CPT

## 2024-09-12 PROCEDURE — 99395 PREV VISIT EST AGE 18-39: CPT | Performed by: HOSPITALIST

## 2024-09-12 PROCEDURE — 80053 COMPREHEN METABOLIC PANEL: CPT

## 2024-09-12 PROCEDURE — 83036 HEMOGLOBIN GLYCOSYLATED A1C: CPT

## 2024-09-12 PROCEDURE — 80061 LIPID PANEL: CPT

## 2024-09-12 PROCEDURE — 84439 ASSAY OF FREE THYROXINE: CPT

## 2024-09-12 PROCEDURE — 84443 ASSAY THYROID STIM HORMONE: CPT

## 2024-09-12 NOTE — ASSESSMENT & PLAN NOTE
Asthma well-controlled.  Patient does not need to use inhaler and only occasionally will feel symptoms during the springtime when pollen is high.     ACT (Asthma Control Test) was reviewed with patient and here are the patient's answers:  (1 = all of the time, 5 + not at all)    Questions #1:  In the past 4 weeks, how much of the time did your asthma keep you from getting as much done at work, school, or home?  +5/5  Question #2:  During the past 4 weeks, how often did you have shortness of breath?  +5/5 Not at all  Questions #3:  During the past 4 weeks, how often did your asthma symptoms (wheezing, coughing, shortness of breath, chest tightness, or pain) wake you up at night or earlier than usual in the morning?  +5/5 Not at all  Question #4:  During the past 4 weeks, how often have you used your rescue inhaler or nebulizer medication (such as albuterol)?  +5/5 Not at all  Question #5:  How would you rate your asthma control during the past 4 weeks?  +5/5  Completely controlled    Patient's score  25/25.  Score of 20-25 indicates that asthma is under control.  Score of 5-19 indicates uncontrolled asthma.

## 2024-09-12 NOTE — ASSESSMENT & PLAN NOTE
Reports 3 days of pain in his right shoulder the inferior angle of the scapula.  Pain is worse with protraction and internal rotation.  Motion and strength preserved throughout.  This is likely consistent with muscle strain of either rotator cuff muscles or rhomboid.  Recommended continuing to take Tylenol as needed for pain.

## 2024-09-12 NOTE — PROGRESS NOTES
Adult Annual Physical  Name: Parviz Schrader      : 1990      MRN: [id number]  Encounter Provider: Doron Flores MD  Encounter Date: 2024   Encounter department: [facility]    Assessment & Plan  Annual physical exam         Other fatigue  1 month history of physical fatigue and daytime sleepiness despite regular 6 to 9 hours sleep intervals.  Does not describe any other symptoms such as fever/chills, night sweats, adenopathy, constipation, weight loss, shortness of breath.  Differential remains broad and includes hypothyroidism, KYLEE, less likely other autoimmune or endocrine disorders, malignancy.    Will obtain TSH and T4 to assess for hypothyroidism as well as CBC for anemia and ESR for potential inflammatory or autoimmune conditions.   Will need to reevaluate patient if any values are significant.  Will also obtain TB test if ESR is elevated.    Can refer for sleep study if all values are within normal limits.    Orders:    TSH + Free T4; Future    Comprehensive metabolic panel; Future    CBC and Platelet; Future    Hemoglobin A1C; Future    Lipid panel; Future    Sedimentation rate, automated; Future    Mild intermittent asthma without complication  Asthma well-controlled.  Patient does not need to use inhaler and only occasionally will feel symptoms during the springtime when pollen is high.     ACT (Asthma Control Test) was reviewed with patient and here are the patient's answers:  (1 = all of the time, 5 + not at all)    Questions #1:  In the past 4 weeks, how much of the time did your asthma keep you from getting as much done at work, school, or home?  +5/5  Question #2:  During the past 4 weeks, how often did you have shortness of breath?  +5/5 Not at all  Questions #3:  During the past 4 weeks, how often did your asthma symptoms (wheezing, coughing, shortness of breath, chest tightness, or pain) wake you up at night or earlier than usual in the morning?  +5/5  Not at all  Question #4:  During the past 4 weeks, how often have you used your rescue inhaler or nebulizer medication (such as albuterol)?  +5/5 Not at all  Question #5:  How would you rate your asthma control during the past 4 weeks?  +5/5  Completely controlled    Patient's score  25/25.  Score of 20-25 indicates that asthma is under control.  Score of 5-19 indicates uncontrolled asthma.     Acute pain of right shoulder  Reports 3 days of pain in his right shoulder the inferior angle of the scapula.  Pain is worse with protraction and internal rotation.  Motion and strength preserved throughout.  This is likely consistent with muscle strain of either rotator cuff muscles or rhomboid.  Recommended continuing to take Tylenol as needed for pain.           Immunizations and preventive care screenings were discussed with patient today. Appropriate education was printed on patient's after visit summary.    Counseling:  Alcohol/drug use: discussed moderation in alcohol intake, the recommendations for healthy alcohol use, and avoidance of illicit drug use.    BMI Counseling: Body mass index is 32.54 kg/m². The BMI is above normal. Nutrition recommendations include moderation in carbohydrate intake and increasing intake of lean protein. Exercise recommendations include moderate physical activity 150 minutes/week and exercising 3-5 times per week. Rationale for BMI follow-up plan is due to patient being overweight or obese.     Depression Screening and Follow-up Plan: Patient was screened for depression during today's encounter. They screened negative with a PHQ-2 score of 2.        History of Present Illness     Parviz is a 34-year-old male with a history of mild intermittent asthma presenting today for annual physical as well as complaint of fatigue for 1 month.  Reports sleeping 6 to 9 hours consistently each night and maintains appropriate sleep hygiene.  However he wakes up not feeling refreshed as well as generally  "tired with mild muscle aches.  He frequently has to take 1 to 2-hour naps during the day which mildly relieves his symptoms.  He has not had any new medical diagnoses and is not currently experiencing any symptoms such as fevers and chills, heat or cold intolerance, constipation or diarrhea, fluctuations in weight.  He denies a family history of hypothyroidism and reports he does not snore as far as he is aware and does not frequently wake up during the night gasping for air.  He also reports 3 days of pain in his right shoulder.  He does not remember any particular inciting event.  He has been taking Tylenol for the pain with mild relief.  Describes it as an aching type of pain that is exacerbated by movement and denies any muscle weakness shooting or electric leg pains.  Adult Annual Physical:  Patient presents for annual physical.     Depression Screening:  - PHQ-2 Score: 2    Review of Systems   Constitutional:  Negative for chills and fever.   HENT:  Negative for ear pain and sore throat.    Eyes:  Negative for pain and visual disturbance.   Respiratory:  Negative for cough and shortness of breath.    Cardiovascular:  Negative for chest pain and palpitations.   Gastrointestinal:  Negative for abdominal pain and vomiting.   Genitourinary:  Negative for dysuria and hematuria.   Musculoskeletal:  Negative for arthralgias and back pain.   Skin:  Negative for color change and rash.   Neurological:  Negative for seizures and syncope.   All other systems reviewed and are negative.        Objective     /77   Pulse 59   Temp 97.7 °F (36.5 °C)   Ht 5' 8\" (1.727 m)   Wt 97.1 kg (214 lb)   SpO2 99%   BMI 32.54 kg/m²     Physical Exam  Vitals and nursing note reviewed.   Constitutional:       General: He is not in acute distress.     Appearance: He is well-developed. He is obese.   HENT:      Head: Normocephalic and atraumatic.      Right Ear: Tympanic membrane and ear canal normal.      Left Ear: Tympanic " membrane and ear canal normal.      Nose: Nose normal. No congestion.      Mouth/Throat:      Mouth: Mucous membranes are moist.      Pharynx: Oropharynx is clear.   Eyes:      Conjunctiva/sclera: Conjunctivae normal.   Cardiovascular:      Rate and Rhythm: Normal rate and regular rhythm.      Heart sounds: No murmur heard.  Pulmonary:      Effort: Pulmonary effort is normal. No respiratory distress.      Breath sounds: Normal breath sounds.   Abdominal:      General: Bowel sounds are normal.      Palpations: Abdomen is soft.      Tenderness: There is no abdominal tenderness.   Musculoskeletal:         General: No swelling.      Cervical back: Neck supple.   Skin:     General: Skin is warm and dry.   Neurological:      General: No focal deficit present.      Mental Status: He is alert.   Psychiatric:         Mood and Affect: Mood normal.       Administrative Statements

## 2024-09-12 NOTE — PATIENT INSTRUCTIONS
"Patient Education     Routine physical for adults   The Basics   Written by the doctors and editors at [facility]   What is a physical? -- A physical is a routine visit, or \"check-up,\" with your doctor. You might also hear it called a \"wellness visit\" or \"preventive visit.\"  During each visit, the doctor will:   Ask about your physical and mental health   Ask about your habits, behaviors, and lifestyle   Do an exam   Give you vaccines if needed   Talk to you about any medicines you take   Give advice about your health   Answer your questions  Getting regular check-ups is an important part of taking care of your health. It can help your doctor find and treat any problems you have. But it's also important for preventing health problems.  A routine physical is different from a \"sick visit.\" A sick visit is when you see a doctor because of a health concern or problem. Since physicals are scheduled ahead of time, you can think about what you want to ask the doctor.  How often should I get a physical? -- It depends on your age and health. In general, for people age 21 years and older:   If you are younger than 50 years, you might be able to get a physical every 3 years.   If you are 50 years or older, your doctor might recommend a physical every year.  If you have an ongoing health condition, like diabetes or high blood pressure, your doctor will probably want to see you more often.  What happens during a physical? -- In general, each visit will include:   Physical exam - The doctor or nurse will check your height, weight, heart rate, and blood pressure. They will also look at your eyes and ears. They will ask about how you are feeling and whether you have any symptoms that bother you.   Medicines - It's a good idea to bring a list of all the medicines you take to each doctor visit. Your doctor will talk to you about your medicines and answer any questions. Tell them if you are having any side effects that bother you. You " "should also tell them if you are having trouble paying for any of your medicines.   Habits and behaviors - This includes:   Your diet   Your exercise habits   Whether you smoke, drink alcohol, or use drugs   Whether you are sexually active   Whether you feel safe at home  Your doctor will talk to you about things you can do to improve your health and lower your risk of health problems. They will also offer help and support. For example, if you want to quit smoking, they can give you advice and might prescribe medicines. If you want to improve your diet or get more physical activity, they can help you with this, too.   Lab tests, if needed - The tests you get will depend on your age and situation. For example, your doctor might want to check your:   Cholesterol   Blood sugar   Iron level   Vaccines - The recommended vaccines will depend on your age, health, and what vaccines you already had. Vaccines are very important because they can prevent certain serious or deadly infections.   Discussion of screening - \"Screening\" means checking for diseases or other health problems before they cause symptoms. Your doctor can recommend screening based on your age, risk, and preferences. This might include tests to check for:   Cancer, such as breast, prostate, cervical, ovarian, colorectal, prostate, lung, or skin cancer   Sexually transmitted infections, such as chlamydia and gonorrhea   Mental health conditions like depression and anxiety  Your doctor will talk to you about the different types of screening tests. They can help you decide which screenings to have. They can also explain what the results might mean.   Answering questions - The physical is a good time to ask the doctor or nurse questions about your health. If needed, they can refer you to other doctors or specialists, too.  Adults older than 65 years often need other care, too. As you get older, your doctor will talk to you about:   How to prevent falling at " home   Hearing or vision tests   Memory testing   How to take your medicines safely   Making sure that you have the help and support you need at home  All topics are updated as new evidence becomes available and our peer review process is complete.  This topic retrieved from [employer] on: May 02, 2024.  Topic 810287 Version 1.0  Release: 32.4.3 - C32.122  © 2024 UpToDate, Inc[employer] and/or its affiliates. All rights reserved.  Consumer Information Use and Disclaimer   Disclaimer: This generalized information is a limited summary of diagnosis, treatment, and/or medication information. It is not meant to be comprehensive and should be used as a tool to help the user understand and/or assess potential diagnostic and treatment options. It does NOT include all information about conditions, treatments, medications, side effects, or risks that may apply to a specific patient. It is not intended to be medical advice or a substitute for the medical advice, diagnosis, or treatment of a health care provider based on the health care provider's examination and assessment of a patient's specific and unique circumstances. Patients must speak with a health care provider for complete information about their health, medical questions, and treatment options, including any risks or benefits regarding use of medications. This information does not endorse any treatments or medications as safe, effective, or approved for treating a specific patient. Up[employer]Date, Inc[employer] and its affiliates disclaim any warranty or liability relating to this information or the use thereof.The use of this information is governed by the Terms of Use, available at https://www.woltersWikinvestuwer.com/en/know/clinical-effectiveness-terms. 2024© UpToDate, Inc. and its affiliates and/or licensors. All rights reserved.  Copyright   © 2024 Up[employer], [employer]. and/or its affiliates. All rights reserved.

## 2024-09-12 NOTE — ASSESSMENT & PLAN NOTE
1 month history of physical fatigue and daytime sleepiness despite regular 6 to 9 hours sleep intervals.  Does not describe any other symptoms such as fever/chills, night sweats, adenopathy, constipation, weight loss, shortness of breath.  Differential remains broad and includes hypothyroidism, KYLEE, less likely other autoimmune or endocrine disorders, malignancy.    Will obtain TSH and T4 to assess for hypothyroidism as well as CBC for anemia and ESR for potential inflammatory or autoimmune conditions.   Will need to reevaluate patient if any values are significant.  Will also obtain TB test if ESR is elevated.    Can refer for sleep study if all values are within normal limits.    Orders:    TSH + Free T4; Future    Comprehensive metabolic panel; Future    CBC and Platelet; Future    Hemoglobin A1C; Future    Lipid panel; Future    Sedimentation rate, automated; Future

## 2024-09-13 DIAGNOSIS — R53.83 FATIGUE, UNSPECIFIED TYPE: Primary | ICD-10-CM

## 2024-09-13 NOTE — PROGRESS NOTES
Labs within normal limits including TSH, T4, and Hgb. Referral placed to sleep medicine. Attempted to call patient and left voicemail explaining next steps.

## 2024-09-24 ENCOUNTER — TRANSCRIBE ORDERS (OUTPATIENT)
Dept: SLEEP CENTER | Facility: CLINIC | Age: 34
End: 2024-09-24

## 2024-09-24 DIAGNOSIS — R53.83 OTHER FATIGUE: Primary | ICD-10-CM

## 2024-12-23 ENCOUNTER — RA CDI HCC (OUTPATIENT)
Dept: OTHER | Facility: HOSPITAL | Age: 34
End: 2024-12-23

## 2024-12-30 ENCOUNTER — TELEPHONE (OUTPATIENT)
Dept: INTERNAL MEDICINE CLINIC | Facility: CLINIC | Age: 34
End: 2024-12-30